# Patient Record
Sex: FEMALE | Race: BLACK OR AFRICAN AMERICAN | NOT HISPANIC OR LATINO | URBAN - METROPOLITAN AREA
[De-identification: names, ages, dates, MRNs, and addresses within clinical notes are randomized per-mention and may not be internally consistent; named-entity substitution may affect disease eponyms.]

---

## 2017-01-01 ENCOUNTER — AMBULATORY - HEALTHEAST (OUTPATIENT)
Dept: BEHAVIORAL HEALTH | Facility: CLINIC | Age: 27
End: 2017-01-01

## 2017-01-04 ENCOUNTER — OFFICE VISIT - HEALTHEAST (OUTPATIENT)
Dept: ADDICTION MEDICINE | Facility: CLINIC | Age: 27
End: 2017-01-04

## 2017-01-04 ENCOUNTER — AMBULATORY - HEALTHEAST (OUTPATIENT)
Dept: BEHAVIORAL HEALTH | Facility: CLINIC | Age: 27
End: 2017-01-04

## 2017-01-04 DIAGNOSIS — F11.20 OPIOID USE DISORDER, SEVERE, DEPENDENCE (H): ICD-10-CM

## 2017-01-04 ASSESSMENT — MIFFLIN-ST. JEOR: SCORE: 1781.83

## 2017-01-05 ENCOUNTER — OFFICE VISIT - HEALTHEAST (OUTPATIENT)
Dept: ADDICTION MEDICINE | Facility: CLINIC | Age: 27
End: 2017-01-05

## 2017-01-05 DIAGNOSIS — F11.20 OPIOID USE DISORDER, SEVERE, DEPENDENCE (H): ICD-10-CM

## 2017-01-06 ENCOUNTER — COMMUNICATION - HEALTHEAST (OUTPATIENT)
Dept: BEHAVIORAL HEALTH | Facility: CLINIC | Age: 27
End: 2017-01-06

## 2017-01-06 DIAGNOSIS — G47.00 INSOMNIA: ICD-10-CM

## 2017-01-06 DIAGNOSIS — F43.22 ADJUSTMENT DISORDER WITH ANXIETY: ICD-10-CM

## 2017-01-08 ENCOUNTER — AMBULATORY - HEALTHEAST (OUTPATIENT)
Dept: BEHAVIORAL HEALTH | Facility: CLINIC | Age: 27
End: 2017-01-08

## 2017-01-09 ENCOUNTER — OFFICE VISIT - HEALTHEAST (OUTPATIENT)
Dept: ADDICTION MEDICINE | Facility: CLINIC | Age: 27
End: 2017-01-09

## 2017-01-09 DIAGNOSIS — F11.20 OPIOID USE DISORDER, SEVERE, DEPENDENCE (H): ICD-10-CM

## 2017-01-10 ENCOUNTER — OFFICE VISIT - HEALTHEAST (OUTPATIENT)
Dept: ADDICTION MEDICINE | Facility: CLINIC | Age: 27
End: 2017-01-10

## 2017-01-10 DIAGNOSIS — F11.20 OPIOID USE DISORDER, SEVERE, DEPENDENCE (H): ICD-10-CM

## 2017-01-12 ENCOUNTER — AMBULATORY - HEALTHEAST (OUTPATIENT)
Dept: BEHAVIORAL HEALTH | Facility: CLINIC | Age: 27
End: 2017-01-12

## 2017-01-12 ENCOUNTER — OFFICE VISIT - HEALTHEAST (OUTPATIENT)
Dept: ADDICTION MEDICINE | Facility: CLINIC | Age: 27
End: 2017-01-12

## 2017-01-12 DIAGNOSIS — F11.20 OPIOID USE DISORDER, SEVERE, DEPENDENCE (H): ICD-10-CM

## 2017-01-12 ASSESSMENT — MIFFLIN-ST. JEOR: SCORE: 1799.98

## 2017-01-13 ENCOUNTER — AMBULATORY - HEALTHEAST (OUTPATIENT)
Dept: ADDICTION MEDICINE | Facility: CLINIC | Age: 27
End: 2017-01-13

## 2017-01-17 ENCOUNTER — OFFICE VISIT - HEALTHEAST (OUTPATIENT)
Dept: ADDICTION MEDICINE | Facility: CLINIC | Age: 27
End: 2017-01-17

## 2017-01-17 DIAGNOSIS — F11.20 OPIOID USE DISORDER, SEVERE, DEPENDENCE (H): ICD-10-CM

## 2017-01-18 ENCOUNTER — OFFICE VISIT - HEALTHEAST (OUTPATIENT)
Dept: ADDICTION MEDICINE | Facility: CLINIC | Age: 27
End: 2017-01-18

## 2017-01-18 DIAGNOSIS — F11.20 OPIOID USE DISORDER, SEVERE, DEPENDENCE (H): ICD-10-CM

## 2017-01-19 ENCOUNTER — OFFICE VISIT - HEALTHEAST (OUTPATIENT)
Dept: ADDICTION MEDICINE | Facility: CLINIC | Age: 27
End: 2017-01-19

## 2017-01-19 ENCOUNTER — OFFICE VISIT - HEALTHEAST (OUTPATIENT)
Dept: BEHAVIORAL HEALTH | Facility: CLINIC | Age: 27
End: 2017-01-19

## 2017-01-19 DIAGNOSIS — F34.1 DYSTHYMIA: ICD-10-CM

## 2017-01-19 DIAGNOSIS — F11.20 OPIOID USE DISORDER, SEVERE, DEPENDENCE (H): ICD-10-CM

## 2017-01-19 ASSESSMENT — MIFFLIN-ST. JEOR: SCORE: 1836.26

## 2017-01-20 ENCOUNTER — AMBULATORY - HEALTHEAST (OUTPATIENT)
Dept: ADDICTION MEDICINE | Facility: CLINIC | Age: 27
End: 2017-01-20

## 2017-01-24 ENCOUNTER — OFFICE VISIT - HEALTHEAST (OUTPATIENT)
Dept: ADDICTION MEDICINE | Facility: CLINIC | Age: 27
End: 2017-01-24

## 2017-01-24 DIAGNOSIS — F11.20 OPIOID USE DISORDER, SEVERE, DEPENDENCE (H): ICD-10-CM

## 2017-01-30 ENCOUNTER — COMMUNICATION - HEALTHEAST (OUTPATIENT)
Dept: ADDICTION MEDICINE | Facility: CLINIC | Age: 27
End: 2017-01-30

## 2017-01-31 ENCOUNTER — OFFICE VISIT - HEALTHEAST (OUTPATIENT)
Dept: ADDICTION MEDICINE | Facility: CLINIC | Age: 27
End: 2017-01-31

## 2017-01-31 ENCOUNTER — AMBULATORY - HEALTHEAST (OUTPATIENT)
Dept: ADDICTION MEDICINE | Facility: CLINIC | Age: 27
End: 2017-01-31

## 2017-01-31 DIAGNOSIS — F11.20 OPIOID USE DISORDER, SEVERE, DEPENDENCE (H): ICD-10-CM

## 2017-02-01 ENCOUNTER — COMMUNICATION - HEALTHEAST (OUTPATIENT)
Dept: ADDICTION MEDICINE | Facility: CLINIC | Age: 27
End: 2017-02-01

## 2017-02-01 ENCOUNTER — OFFICE VISIT - HEALTHEAST (OUTPATIENT)
Dept: ADDICTION MEDICINE | Facility: CLINIC | Age: 27
End: 2017-02-01

## 2017-02-01 DIAGNOSIS — F11.20 OPIOID USE DISORDER, SEVERE, DEPENDENCE (H): ICD-10-CM

## 2017-02-02 ENCOUNTER — OFFICE VISIT - HEALTHEAST (OUTPATIENT)
Dept: ADDICTION MEDICINE | Facility: CLINIC | Age: 27
End: 2017-02-02

## 2017-02-02 ENCOUNTER — OFFICE VISIT - HEALTHEAST (OUTPATIENT)
Dept: BEHAVIORAL HEALTH | Facility: CLINIC | Age: 27
End: 2017-02-02

## 2017-02-02 DIAGNOSIS — G47.00 INSOMNIA: ICD-10-CM

## 2017-02-02 DIAGNOSIS — F11.20 OPIOID USE DISORDER, SEVERE, DEPENDENCE (H): ICD-10-CM

## 2017-02-02 DIAGNOSIS — F34.1 DYSTHYMIA: ICD-10-CM

## 2017-02-02 ASSESSMENT — MIFFLIN-ST. JEOR: SCORE: 1818.12

## 2017-02-03 ENCOUNTER — AMBULATORY - HEALTHEAST (OUTPATIENT)
Dept: ADDICTION MEDICINE | Facility: CLINIC | Age: 27
End: 2017-02-03

## 2017-02-08 ENCOUNTER — AMBULATORY - HEALTHEAST (OUTPATIENT)
Dept: ADDICTION MEDICINE | Facility: CLINIC | Age: 27
End: 2017-02-08

## 2017-02-08 ENCOUNTER — OFFICE VISIT - HEALTHEAST (OUTPATIENT)
Dept: BEHAVIORAL HEALTH | Facility: CLINIC | Age: 27
End: 2017-02-08

## 2017-02-08 ENCOUNTER — OFFICE VISIT - HEALTHEAST (OUTPATIENT)
Dept: ADDICTION MEDICINE | Facility: CLINIC | Age: 27
End: 2017-02-08

## 2017-02-08 DIAGNOSIS — F33.0 MAJOR DEPRESSIVE DISORDER, RECURRENT EPISODE, MILD (H): ICD-10-CM

## 2017-02-08 DIAGNOSIS — F11.10 OPIOID USE DISORDER, MILD, ABUSE (H): ICD-10-CM

## 2017-02-08 DIAGNOSIS — F11.20 OPIOID USE DISORDER, SEVERE, DEPENDENCE (H): ICD-10-CM

## 2017-02-08 DIAGNOSIS — F41.1 GAD (GENERALIZED ANXIETY DISORDER): ICD-10-CM

## 2017-02-10 ENCOUNTER — AMBULATORY - HEALTHEAST (OUTPATIENT)
Dept: ADDICTION MEDICINE | Facility: CLINIC | Age: 27
End: 2017-02-10

## 2017-02-15 ENCOUNTER — OFFICE VISIT - HEALTHEAST (OUTPATIENT)
Dept: ADDICTION MEDICINE | Facility: CLINIC | Age: 27
End: 2017-02-15

## 2017-02-15 DIAGNOSIS — F11.20 OPIOID USE DISORDER, SEVERE, DEPENDENCE (H): ICD-10-CM

## 2017-02-16 ENCOUNTER — OFFICE VISIT - HEALTHEAST (OUTPATIENT)
Dept: BEHAVIORAL HEALTH | Facility: CLINIC | Age: 27
End: 2017-02-16

## 2017-02-16 DIAGNOSIS — F34.1 DYSTHYMIA: ICD-10-CM

## 2017-02-16 DIAGNOSIS — F43.22 ADJUSTMENT DISORDER WITH ANXIETY: ICD-10-CM

## 2017-02-16 DIAGNOSIS — F11.20 OPIOID USE DISORDER, SEVERE, DEPENDENCE (H): ICD-10-CM

## 2017-02-16 ASSESSMENT — MIFFLIN-ST. JEOR: SCORE: 1863.48

## 2017-02-17 ENCOUNTER — COMMUNICATION - HEALTHEAST (OUTPATIENT)
Dept: BEHAVIORAL HEALTH | Facility: CLINIC | Age: 27
End: 2017-02-17

## 2017-02-20 ENCOUNTER — RECORDS - HEALTHEAST (OUTPATIENT)
Dept: ADMINISTRATIVE | Facility: OTHER | Age: 27
End: 2017-02-20

## 2017-02-20 ENCOUNTER — AMBULATORY - HEALTHEAST (OUTPATIENT)
Dept: ADDICTION MEDICINE | Facility: CLINIC | Age: 27
End: 2017-02-20

## 2017-02-22 ENCOUNTER — OFFICE VISIT - HEALTHEAST (OUTPATIENT)
Dept: ADDICTION MEDICINE | Facility: CLINIC | Age: 27
End: 2017-02-22

## 2017-02-22 DIAGNOSIS — F11.20 OPIOID USE DISORDER, SEVERE, DEPENDENCE (H): ICD-10-CM

## 2017-02-24 ENCOUNTER — AMBULATORY - HEALTHEAST (OUTPATIENT)
Dept: ADDICTION MEDICINE | Facility: CLINIC | Age: 27
End: 2017-02-24

## 2017-03-01 ENCOUNTER — AMBULATORY - HEALTHEAST (OUTPATIENT)
Dept: BEHAVIORAL HEALTH | Facility: CLINIC | Age: 27
End: 2017-03-01

## 2017-03-01 ENCOUNTER — OFFICE VISIT - HEALTHEAST (OUTPATIENT)
Dept: ADDICTION MEDICINE | Facility: CLINIC | Age: 27
End: 2017-03-01

## 2017-03-01 DIAGNOSIS — F34.1 DYSTHYMIA: ICD-10-CM

## 2017-03-01 DIAGNOSIS — F11.20 OPIOID USE DISORDER, SEVERE, DEPENDENCE (H): ICD-10-CM

## 2017-03-01 DIAGNOSIS — G47.00 INSOMNIA: ICD-10-CM

## 2017-03-01 ASSESSMENT — MIFFLIN-ST. JEOR: SCORE: 1868.01

## 2017-03-03 ENCOUNTER — AMBULATORY - HEALTHEAST (OUTPATIENT)
Dept: ADDICTION MEDICINE | Facility: CLINIC | Age: 27
End: 2017-03-03

## 2017-03-06 ENCOUNTER — AMBULATORY - HEALTHEAST (OUTPATIENT)
Dept: BEHAVIORAL HEALTH | Facility: CLINIC | Age: 27
End: 2017-03-06

## 2017-03-08 ENCOUNTER — AMBULATORY - HEALTHEAST (OUTPATIENT)
Dept: BEHAVIORAL HEALTH | Facility: CLINIC | Age: 27
End: 2017-03-08

## 2017-03-16 ENCOUNTER — OFFICE VISIT - HEALTHEAST (OUTPATIENT)
Dept: ADDICTION MEDICINE | Facility: CLINIC | Age: 27
End: 2017-03-16

## 2017-03-16 ENCOUNTER — OFFICE VISIT - HEALTHEAST (OUTPATIENT)
Dept: BEHAVIORAL HEALTH | Facility: CLINIC | Age: 27
End: 2017-03-16

## 2017-03-16 DIAGNOSIS — F34.1 DYSTHYMIA: ICD-10-CM

## 2017-03-16 DIAGNOSIS — F11.20 OPIOID USE DISORDER, SEVERE, DEPENDENCE (H): ICD-10-CM

## 2017-03-16 DIAGNOSIS — F43.22 ADJUSTMENT DISORDER WITH ANXIETY: ICD-10-CM

## 2017-03-16 ASSESSMENT — MIFFLIN-ST. JEOR: SCORE: 1895.23

## 2017-03-17 ENCOUNTER — OFFICE VISIT - HEALTHEAST (OUTPATIENT)
Dept: BEHAVIORAL HEALTH | Facility: CLINIC | Age: 27
End: 2017-03-17

## 2017-03-17 ENCOUNTER — AMBULATORY - HEALTHEAST (OUTPATIENT)
Dept: ADDICTION MEDICINE | Facility: CLINIC | Age: 27
End: 2017-03-17

## 2017-03-17 DIAGNOSIS — F11.10 OPIOID USE DISORDER, MILD, ABUSE (H): ICD-10-CM

## 2017-03-17 DIAGNOSIS — F41.1 GAD (GENERALIZED ANXIETY DISORDER): ICD-10-CM

## 2017-03-17 DIAGNOSIS — F33.0 MAJOR DEPRESSIVE DISORDER, RECURRENT EPISODE, MILD (H): ICD-10-CM

## 2017-03-20 ENCOUNTER — COMMUNICATION - HEALTHEAST (OUTPATIENT)
Dept: BEHAVIORAL HEALTH | Facility: CLINIC | Age: 27
End: 2017-03-20

## 2017-03-31 ENCOUNTER — OFFICE VISIT - HEALTHEAST (OUTPATIENT)
Dept: BEHAVIORAL HEALTH | Facility: CLINIC | Age: 27
End: 2017-03-31

## 2017-03-31 ENCOUNTER — AMBULATORY - HEALTHEAST (OUTPATIENT)
Dept: BEHAVIORAL HEALTH | Facility: CLINIC | Age: 27
End: 2017-03-31

## 2017-03-31 DIAGNOSIS — F11.20 OPIOID USE DISORDER, SEVERE, DEPENDENCE (H): ICD-10-CM

## 2017-03-31 ASSESSMENT — MIFFLIN-ST. JEOR: SCORE: 1895.23

## 2017-04-03 ENCOUNTER — COMMUNICATION - HEALTHEAST (OUTPATIENT)
Dept: BEHAVIORAL HEALTH | Facility: CLINIC | Age: 27
End: 2017-04-03

## 2017-04-09 ENCOUNTER — COMMUNICATION - HEALTHEAST (OUTPATIENT)
Dept: BEHAVIORAL HEALTH | Facility: CLINIC | Age: 27
End: 2017-04-09

## 2017-04-09 DIAGNOSIS — F34.1 DYSTHYMIA: ICD-10-CM

## 2017-04-13 ENCOUNTER — AMBULATORY - HEALTHEAST (OUTPATIENT)
Dept: BEHAVIORAL HEALTH | Facility: CLINIC | Age: 27
End: 2017-04-13

## 2017-04-13 DIAGNOSIS — F34.1 DYSTHYMIA: ICD-10-CM

## 2017-04-13 DIAGNOSIS — F11.20 OPIOID USE DISORDER, SEVERE, DEPENDENCE (H): ICD-10-CM

## 2017-04-13 DIAGNOSIS — F43.22 ADJUSTMENT DISORDER WITH ANXIETY: ICD-10-CM

## 2017-04-13 ASSESSMENT — MIFFLIN-ST. JEOR: SCORE: 1917.91

## 2017-04-17 ENCOUNTER — COMMUNICATION - HEALTHEAST (OUTPATIENT)
Dept: ADDICTION MEDICINE | Facility: CLINIC | Age: 27
End: 2017-04-17

## 2017-04-17 ENCOUNTER — OFFICE VISIT - HEALTHEAST (OUTPATIENT)
Dept: ADDICTION MEDICINE | Facility: CLINIC | Age: 27
End: 2017-04-17

## 2017-04-17 DIAGNOSIS — F11.20 OPIOID USE DISORDER, SEVERE, DEPENDENCE (H): ICD-10-CM

## 2017-04-21 ENCOUNTER — AMBULATORY - HEALTHEAST (OUTPATIENT)
Dept: ADDICTION MEDICINE | Facility: CLINIC | Age: 27
End: 2017-04-21

## 2017-04-26 ENCOUNTER — COMMUNICATION - HEALTHEAST (OUTPATIENT)
Dept: ADDICTION MEDICINE | Facility: CLINIC | Age: 27
End: 2017-04-26

## 2017-04-27 ENCOUNTER — OFFICE VISIT - HEALTHEAST (OUTPATIENT)
Dept: BEHAVIORAL HEALTH | Facility: CLINIC | Age: 27
End: 2017-04-27

## 2017-04-27 DIAGNOSIS — F11.20 OPIOID USE DISORDER, SEVERE, DEPENDENCE (H): ICD-10-CM

## 2017-04-27 DIAGNOSIS — F33.0 MAJOR DEPRESSIVE DISORDER, RECURRENT EPISODE, MILD (H): ICD-10-CM

## 2017-04-27 DIAGNOSIS — F41.1 GAD (GENERALIZED ANXIETY DISORDER): ICD-10-CM

## 2017-04-28 ENCOUNTER — OFFICE VISIT - HEALTHEAST (OUTPATIENT)
Dept: BEHAVIORAL HEALTH | Facility: CLINIC | Age: 27
End: 2017-04-28

## 2017-04-28 ENCOUNTER — AMBULATORY - HEALTHEAST (OUTPATIENT)
Dept: BEHAVIORAL HEALTH | Facility: CLINIC | Age: 27
End: 2017-04-28

## 2017-04-28 DIAGNOSIS — F11.20 OPIOID USE DISORDER, SEVERE, DEPENDENCE (H): ICD-10-CM

## 2017-04-28 DIAGNOSIS — F34.1 DYSTHYMIA: ICD-10-CM

## 2017-04-28 ASSESSMENT — MIFFLIN-ST. JEOR: SCORE: 1940.59

## 2017-05-03 ENCOUNTER — OFFICE VISIT - HEALTHEAST (OUTPATIENT)
Dept: ADDICTION MEDICINE | Facility: CLINIC | Age: 27
End: 2017-05-03

## 2017-05-03 DIAGNOSIS — F11.20 OPIOID USE DISORDER, SEVERE, DEPENDENCE (H): ICD-10-CM

## 2017-05-05 ENCOUNTER — AMBULATORY - HEALTHEAST (OUTPATIENT)
Dept: ADDICTION MEDICINE | Facility: CLINIC | Age: 27
End: 2017-05-05

## 2017-05-15 ENCOUNTER — AMBULATORY - HEALTHEAST (OUTPATIENT)
Dept: ADDICTION MEDICINE | Facility: CLINIC | Age: 27
End: 2017-05-15

## 2017-05-25 ENCOUNTER — OFFICE VISIT - HEALTHEAST (OUTPATIENT)
Dept: BEHAVIORAL HEALTH | Facility: CLINIC | Age: 27
End: 2017-05-25

## 2017-05-25 DIAGNOSIS — F11.20 OPIOID USE DISORDER, SEVERE, DEPENDENCE (H): ICD-10-CM

## 2017-05-25 DIAGNOSIS — F34.1 DYSTHYMIA: ICD-10-CM

## 2017-05-25 ASSESSMENT — MIFFLIN-ST. JEOR: SCORE: 1936.05

## 2017-06-22 ENCOUNTER — COMMUNICATION - HEALTHEAST (OUTPATIENT)
Dept: ADDICTION MEDICINE | Facility: CLINIC | Age: 27
End: 2017-06-22

## 2017-06-29 ENCOUNTER — OFFICE VISIT - HEALTHEAST (OUTPATIENT)
Dept: ADDICTION MEDICINE | Facility: CLINIC | Age: 27
End: 2017-06-29

## 2017-06-29 ENCOUNTER — OFFICE VISIT - HEALTHEAST (OUTPATIENT)
Dept: BEHAVIORAL HEALTH | Facility: CLINIC | Age: 27
End: 2017-06-29

## 2017-06-29 DIAGNOSIS — F34.1 DYSTHYMIA: ICD-10-CM

## 2017-06-29 DIAGNOSIS — G47.00 INSOMNIA, UNSPECIFIED: ICD-10-CM

## 2017-06-29 DIAGNOSIS — F11.20 OPIOID USE DISORDER, SEVERE, DEPENDENCE (H): ICD-10-CM

## 2017-06-29 DIAGNOSIS — F43.22 ADJUSTMENT DISORDER WITH ANXIETY: ICD-10-CM

## 2017-06-29 ASSESSMENT — MIFFLIN-ST. JEOR: SCORE: 1949.66

## 2017-06-30 ENCOUNTER — AMBULATORY - HEALTHEAST (OUTPATIENT)
Dept: ADDICTION MEDICINE | Facility: CLINIC | Age: 27
End: 2017-06-30

## 2017-07-21 ENCOUNTER — OFFICE VISIT - HEALTHEAST (OUTPATIENT)
Dept: BEHAVIORAL HEALTH | Facility: CLINIC | Age: 27
End: 2017-07-21

## 2017-07-21 DIAGNOSIS — F41.1 GAD (GENERALIZED ANXIETY DISORDER): ICD-10-CM

## 2017-07-21 DIAGNOSIS — F33.0 MAJOR DEPRESSIVE DISORDER, RECURRENT EPISODE, MILD (H): ICD-10-CM

## 2017-07-25 ENCOUNTER — AMBULATORY - HEALTHEAST (OUTPATIENT)
Dept: ADDICTION MEDICINE | Facility: CLINIC | Age: 27
End: 2017-07-25

## 2017-08-02 ENCOUNTER — OFFICE VISIT - HEALTHEAST (OUTPATIENT)
Dept: BEHAVIORAL HEALTH | Facility: CLINIC | Age: 27
End: 2017-08-02

## 2017-08-02 DIAGNOSIS — F11.20 OPIOID USE DISORDER, SEVERE, DEPENDENCE (H): ICD-10-CM

## 2017-08-02 DIAGNOSIS — F43.22 ADJUSTMENT DISORDER WITH ANXIETY: ICD-10-CM

## 2017-08-02 DIAGNOSIS — F34.1 DYSTHYMIA: ICD-10-CM

## 2017-08-02 ASSESSMENT — MIFFLIN-ST. JEOR: SCORE: 1967.81

## 2017-08-09 ENCOUNTER — AMBULATORY - HEALTHEAST (OUTPATIENT)
Dept: ADDICTION MEDICINE | Facility: CLINIC | Age: 27
End: 2017-08-09

## 2017-08-10 ENCOUNTER — AMBULATORY - HEALTHEAST (OUTPATIENT)
Dept: ADDICTION MEDICINE | Facility: CLINIC | Age: 27
End: 2017-08-10

## 2017-08-23 ENCOUNTER — COMMUNICATION - HEALTHEAST (OUTPATIENT)
Dept: BEHAVIORAL HEALTH | Facility: CLINIC | Age: 27
End: 2017-08-23

## 2017-08-31 ENCOUNTER — AMBULATORY - HEALTHEAST (OUTPATIENT)
Dept: LAB | Facility: CLINIC | Age: 27
End: 2017-08-31

## 2017-08-31 ENCOUNTER — OFFICE VISIT - HEALTHEAST (OUTPATIENT)
Dept: BEHAVIORAL HEALTH | Facility: CLINIC | Age: 27
End: 2017-08-31

## 2017-08-31 ENCOUNTER — COMMUNICATION - HEALTHEAST (OUTPATIENT)
Dept: BEHAVIORAL HEALTH | Facility: CLINIC | Age: 27
End: 2017-08-31

## 2017-08-31 DIAGNOSIS — F11.20 OPIOID USE DISORDER, SEVERE, DEPENDENCE (H): ICD-10-CM

## 2017-08-31 DIAGNOSIS — F33.0 MAJOR DEPRESSIVE DISORDER, RECURRENT EPISODE, MILD (H): ICD-10-CM

## 2017-08-31 DIAGNOSIS — F41.1 GAD (GENERALIZED ANXIETY DISORDER): ICD-10-CM

## 2017-08-31 DIAGNOSIS — F34.1 DYSTHYMIA: ICD-10-CM

## 2017-09-01 ENCOUNTER — AMBULATORY - HEALTHEAST (OUTPATIENT)
Dept: BEHAVIORAL HEALTH | Facility: CLINIC | Age: 27
End: 2017-09-01

## 2017-09-06 ENCOUNTER — COMMUNICATION - HEALTHEAST (OUTPATIENT)
Dept: BEHAVIORAL HEALTH | Facility: CLINIC | Age: 27
End: 2017-09-06

## 2017-09-20 ENCOUNTER — OFFICE VISIT - HEALTHEAST (OUTPATIENT)
Dept: BEHAVIORAL HEALTH | Facility: CLINIC | Age: 27
End: 2017-09-20

## 2017-09-20 DIAGNOSIS — F11.20 OPIATE DEPENDENCE (H): ICD-10-CM

## 2017-09-28 ENCOUNTER — AMBULATORY - HEALTHEAST (OUTPATIENT)
Dept: LAB | Facility: CLINIC | Age: 27
End: 2017-09-28

## 2017-09-28 ENCOUNTER — COMMUNICATION - HEALTHEAST (OUTPATIENT)
Dept: BEHAVIORAL HEALTH | Facility: CLINIC | Age: 27
End: 2017-09-28

## 2017-09-28 ENCOUNTER — OFFICE VISIT - HEALTHEAST (OUTPATIENT)
Dept: BEHAVIORAL HEALTH | Facility: CLINIC | Age: 27
End: 2017-09-28

## 2017-09-28 DIAGNOSIS — F11.20 OPIOID USE DISORDER, SEVERE, DEPENDENCE (H): ICD-10-CM

## 2017-09-28 DIAGNOSIS — F34.1 DYSTHYMIA: ICD-10-CM

## 2017-09-29 ENCOUNTER — COMMUNICATION - HEALTHEAST (OUTPATIENT)
Dept: BEHAVIORAL HEALTH | Facility: CLINIC | Age: 27
End: 2017-09-29

## 2017-09-29 ENCOUNTER — RECORDS - HEALTHEAST (OUTPATIENT)
Dept: ADMINISTRATIVE | Facility: OTHER | Age: 27
End: 2017-09-29

## 2017-10-03 ENCOUNTER — COMMUNICATION - HEALTHEAST (OUTPATIENT)
Dept: BEHAVIORAL HEALTH | Facility: CLINIC | Age: 27
End: 2017-10-03

## 2017-10-04 ENCOUNTER — COMMUNICATION - HEALTHEAST (OUTPATIENT)
Dept: BEHAVIORAL HEALTH | Facility: CLINIC | Age: 27
End: 2017-10-04

## 2017-10-11 ENCOUNTER — COMMUNICATION - HEALTHEAST (OUTPATIENT)
Dept: BEHAVIORAL HEALTH | Facility: CLINIC | Age: 27
End: 2017-10-11

## 2017-10-11 ENCOUNTER — OFFICE VISIT - HEALTHEAST (OUTPATIENT)
Dept: BEHAVIORAL HEALTH | Facility: CLINIC | Age: 27
End: 2017-10-11

## 2017-10-11 ENCOUNTER — COMMUNICATION - HEALTHEAST (OUTPATIENT)
Dept: BEHAVIORAL HEALTH | Facility: HOSPITAL | Age: 27
End: 2017-10-11

## 2017-10-11 DIAGNOSIS — F11.20 OPIOID USE DISORDER, SEVERE, DEPENDENCE (H): ICD-10-CM

## 2017-10-11 DIAGNOSIS — F43.22 ADJUSTMENT DISORDER WITH ANXIETY: ICD-10-CM

## 2017-10-11 ASSESSMENT — MIFFLIN-ST. JEOR: SCORE: 1995.02

## 2017-10-16 ENCOUNTER — COMMUNICATION - HEALTHEAST (OUTPATIENT)
Dept: INTERNAL MEDICINE | Facility: CLINIC | Age: 27
End: 2017-10-16

## 2017-10-18 ENCOUNTER — COMMUNICATION - HEALTHEAST (OUTPATIENT)
Dept: BEHAVIORAL HEALTH | Facility: CLINIC | Age: 27
End: 2017-10-18

## 2017-10-19 ENCOUNTER — OFFICE VISIT - HEALTHEAST (OUTPATIENT)
Dept: BEHAVIORAL HEALTH | Facility: CLINIC | Age: 27
End: 2017-10-19

## 2017-10-19 DIAGNOSIS — F11.20 OPIOID USE DISORDER, SEVERE, DEPENDENCE (H): ICD-10-CM

## 2017-10-19 ASSESSMENT — MIFFLIN-ST. JEOR: SCORE: 1999.56

## 2017-10-25 ENCOUNTER — AMBULATORY - HEALTHEAST (OUTPATIENT)
Dept: BEHAVIORAL HEALTH | Facility: CLINIC | Age: 27
End: 2017-10-25

## 2017-10-25 DIAGNOSIS — F11.20 OPIOID USE DISORDER, SEVERE, DEPENDENCE (H): ICD-10-CM

## 2017-10-25 DIAGNOSIS — F43.22 ADJUSTMENT DISORDER WITH ANXIETY: ICD-10-CM

## 2017-10-25 ASSESSMENT — MIFFLIN-ST. JEOR: SCORE: 2008.63

## 2017-10-26 ENCOUNTER — COMMUNICATION - HEALTHEAST (OUTPATIENT)
Dept: BEHAVIORAL HEALTH | Facility: CLINIC | Age: 27
End: 2017-10-26

## 2017-10-26 ENCOUNTER — AMBULATORY - HEALTHEAST (OUTPATIENT)
Dept: BEHAVIORAL HEALTH | Facility: CLINIC | Age: 27
End: 2017-10-26

## 2017-10-26 DIAGNOSIS — F11.10 OPIATE ABUSE, CONTINUOUS (H): ICD-10-CM

## 2017-10-27 ENCOUNTER — COMMUNICATION - HEALTHEAST (OUTPATIENT)
Dept: BEHAVIORAL HEALTH | Facility: CLINIC | Age: 27
End: 2017-10-27

## 2017-10-27 DIAGNOSIS — F11.20 OPIOID USE DISORDER, SEVERE, DEPENDENCE (H): ICD-10-CM

## 2017-10-31 ENCOUNTER — COMMUNICATION - HEALTHEAST (OUTPATIENT)
Dept: BEHAVIORAL HEALTH | Facility: CLINIC | Age: 27
End: 2017-10-31

## 2017-10-31 DIAGNOSIS — F43.22 ADJUSTMENT DISORDER WITH ANXIETY: ICD-10-CM

## 2017-11-01 ENCOUNTER — AMBULATORY - HEALTHEAST (OUTPATIENT)
Dept: BEHAVIORAL HEALTH | Facility: CLINIC | Age: 27
End: 2017-11-01

## 2017-11-01 ENCOUNTER — OFFICE VISIT - HEALTHEAST (OUTPATIENT)
Dept: BEHAVIORAL HEALTH | Facility: CLINIC | Age: 27
End: 2017-11-01

## 2017-11-01 DIAGNOSIS — F43.22 ADJUSTMENT DISORDER WITH ANXIETY: ICD-10-CM

## 2017-11-01 DIAGNOSIS — F11.20 OPIOID USE DISORDER, SEVERE, DEPENDENCE (H): ICD-10-CM

## 2017-11-01 ASSESSMENT — MIFFLIN-ST. JEOR: SCORE: 1972.34

## 2017-11-06 ENCOUNTER — COMMUNICATION - HEALTHEAST (OUTPATIENT)
Dept: BEHAVIORAL HEALTH | Facility: CLINIC | Age: 27
End: 2017-11-06

## 2017-11-10 ENCOUNTER — AMBULATORY - HEALTHEAST (OUTPATIENT)
Dept: BEHAVIORAL HEALTH | Facility: CLINIC | Age: 27
End: 2017-11-10

## 2017-11-10 ENCOUNTER — OFFICE VISIT - HEALTHEAST (OUTPATIENT)
Dept: BEHAVIORAL HEALTH | Facility: CLINIC | Age: 27
End: 2017-11-10

## 2017-11-10 DIAGNOSIS — F11.20 OPIOID USE DISORDER, SEVERE, DEPENDENCE (H): ICD-10-CM

## 2017-11-10 DIAGNOSIS — F43.22 ADJUSTMENT DISORDER WITH ANXIETY: ICD-10-CM

## 2017-11-10 ASSESSMENT — MIFFLIN-ST. JEOR: SCORE: 1972.34

## 2017-11-16 ENCOUNTER — AMBULATORY - HEALTHEAST (OUTPATIENT)
Dept: BEHAVIORAL HEALTH | Facility: CLINIC | Age: 27
End: 2017-11-16

## 2017-11-16 DIAGNOSIS — F11.20 OPIOID USE DISORDER, SEVERE, DEPENDENCE (H): ICD-10-CM

## 2017-11-16 ASSESSMENT — MIFFLIN-ST. JEOR: SCORE: 1963.27

## 2017-11-24 ENCOUNTER — COMMUNICATION - HEALTHEAST (OUTPATIENT)
Dept: BEHAVIORAL HEALTH | Facility: CLINIC | Age: 27
End: 2017-11-24

## 2017-11-24 DIAGNOSIS — F43.22 ADJUSTMENT DISORDER WITH ANXIETY: ICD-10-CM

## 2017-11-29 ENCOUNTER — AMBULATORY - HEALTHEAST (OUTPATIENT)
Dept: BEHAVIORAL HEALTH | Facility: CLINIC | Age: 27
End: 2017-11-29

## 2017-11-29 DIAGNOSIS — F11.20 OPIOID USE DISORDER, SEVERE, DEPENDENCE (H): ICD-10-CM

## 2017-11-29 ASSESSMENT — MIFFLIN-ST. JEOR: SCORE: 1981.41

## 2017-12-07 ENCOUNTER — AMBULATORY - HEALTHEAST (OUTPATIENT)
Dept: BEHAVIORAL HEALTH | Facility: CLINIC | Age: 27
End: 2017-12-07

## 2017-12-07 DIAGNOSIS — F11.20 OPIOID USE DISORDER, SEVERE, DEPENDENCE (H): ICD-10-CM

## 2017-12-07 ASSESSMENT — MIFFLIN-ST. JEOR: SCORE: 1985.95

## 2017-12-08 ENCOUNTER — AMBULATORY - HEALTHEAST (OUTPATIENT)
Dept: BEHAVIORAL HEALTH | Facility: CLINIC | Age: 27
End: 2017-12-08

## 2017-12-14 ENCOUNTER — AMBULATORY - HEALTHEAST (OUTPATIENT)
Dept: BEHAVIORAL HEALTH | Facility: CLINIC | Age: 27
End: 2017-12-14

## 2017-12-14 DIAGNOSIS — F11.20 OPIOID USE DISORDER, SEVERE, DEPENDENCE (H): ICD-10-CM

## 2017-12-14 ASSESSMENT — MIFFLIN-ST. JEOR: SCORE: 1963.27

## 2017-12-15 ENCOUNTER — OFFICE VISIT - HEALTHEAST (OUTPATIENT)
Dept: BEHAVIORAL HEALTH | Facility: CLINIC | Age: 27
End: 2017-12-15

## 2017-12-15 ENCOUNTER — COMMUNICATION - HEALTHEAST (OUTPATIENT)
Dept: BEHAVIORAL HEALTH | Facility: CLINIC | Age: 27
End: 2017-12-15

## 2017-12-15 DIAGNOSIS — Z91.199 NO-SHOW FOR APPOINTMENT: ICD-10-CM

## 2017-12-15 DIAGNOSIS — F11.20 OPIATE ADDICTION (H): ICD-10-CM

## 2017-12-20 ENCOUNTER — OFFICE VISIT - HEALTHEAST (OUTPATIENT)
Dept: BEHAVIORAL HEALTH | Facility: CLINIC | Age: 27
End: 2017-12-20

## 2017-12-20 DIAGNOSIS — F11.20 OPIOID USE DISORDER, SEVERE, DEPENDENCE (H): ICD-10-CM

## 2017-12-20 ASSESSMENT — MIFFLIN-ST. JEOR: SCORE: 1958.73

## 2021-05-30 VITALS — HEIGHT: 67 IN | WEIGHT: 232 LBS | BODY MASS INDEX: 36.41 KG/M2

## 2021-05-30 VITALS — BODY MASS INDEX: 41.28 KG/M2 | WEIGHT: 263 LBS | HEIGHT: 67 IN

## 2021-05-30 VITALS — BODY MASS INDEX: 37.04 KG/M2 | WEIGHT: 236 LBS | HEIGHT: 67 IN

## 2021-05-30 VITALS — WEIGHT: 247 LBS | HEIGHT: 67 IN | BODY MASS INDEX: 38.77 KG/M2

## 2021-05-30 VITALS — HEIGHT: 67 IN | BODY MASS INDEX: 38.61 KG/M2 | WEIGHT: 246 LBS

## 2021-05-30 VITALS — BODY MASS INDEX: 40.49 KG/M2 | HEIGHT: 67 IN | WEIGHT: 258 LBS

## 2021-05-30 VITALS — HEIGHT: 67 IN | WEIGHT: 253 LBS | BODY MASS INDEX: 39.71 KG/M2

## 2021-05-30 VITALS — BODY MASS INDEX: 39.71 KG/M2 | HEIGHT: 67 IN | WEIGHT: 253 LBS

## 2021-05-30 VITALS — WEIGHT: 228 LBS | BODY MASS INDEX: 35.79 KG/M2 | HEIGHT: 67 IN

## 2021-05-30 VITALS — WEIGHT: 240 LBS | HEIGHT: 67 IN | BODY MASS INDEX: 37.67 KG/M2

## 2021-05-31 VITALS — BODY MASS INDEX: 41.91 KG/M2 | HEIGHT: 67 IN | WEIGHT: 267 LBS

## 2021-05-31 VITALS — HEIGHT: 67 IN | BODY MASS INDEX: 43.32 KG/M2 | WEIGHT: 276 LBS

## 2021-05-31 VITALS — HEIGHT: 67 IN | BODY MASS INDEX: 42.69 KG/M2 | WEIGHT: 272 LBS

## 2021-05-31 VITALS — HEIGHT: 67 IN | BODY MASS INDEX: 42.06 KG/M2 | WEIGHT: 268 LBS

## 2021-05-31 VITALS — WEIGHT: 270 LBS | HEIGHT: 67 IN | BODY MASS INDEX: 42.38 KG/M2

## 2021-05-31 VITALS — BODY MASS INDEX: 42.38 KG/M2 | HEIGHT: 67 IN | WEIGHT: 270 LBS

## 2021-05-31 VITALS — HEIGHT: 67 IN | BODY MASS INDEX: 41.59 KG/M2 | WEIGHT: 265 LBS

## 2021-05-31 VITALS — HEIGHT: 67 IN | WEIGHT: 275 LBS | BODY MASS INDEX: 43.16 KG/M2

## 2021-05-31 VITALS — HEIGHT: 67 IN | WEIGHT: 269 LBS | BODY MASS INDEX: 42.22 KG/M2

## 2021-05-31 VITALS — HEIGHT: 67 IN | WEIGHT: 268 LBS | BODY MASS INDEX: 42.06 KG/M2

## 2021-05-31 VITALS — WEIGHT: 262 LBS | HEIGHT: 67 IN | BODY MASS INDEX: 41.12 KG/M2

## 2021-05-31 VITALS — BODY MASS INDEX: 42.85 KG/M2 | WEIGHT: 273 LBS | HEIGHT: 67 IN

## 2021-05-31 VITALS — HEIGHT: 67 IN | BODY MASS INDEX: 43.63 KG/M2 | WEIGHT: 278 LBS

## 2021-06-01 ENCOUNTER — RECORDS - HEALTHEAST (OUTPATIENT)
Dept: ADMINISTRATIVE | Facility: CLINIC | Age: 31
End: 2021-06-01
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
## 2021-06-08 NOTE — PROGRESS NOTES
D)1:1  A)Maria Fernandaindirania came to [facility] by mistake and this writer was at patient's apartment building.    R)Pt apologized the the mistake.  This writer drove back to [facility] to meet with patient.  Pt had her 2 year old son Walt with her.    T)Pt updated on her last child protection hearing and said it went very well.  Pt said that once Shamar completes the assessment, she can start seeing her 4 month old.  Pt said the next child protection court is scheduled for May and they may close the case at that point.  Pt said she contacted the day care and trying to get Shamar in.  Shamar acted just as any 2 year olds.  He was very active and curious about any objects around him.  Pt said the father is getting picked up by his friend and going to work every day.  When this writer was at patient's apartment, there was a 's office and there seemed to be many services there.  Pt said she is planning on taking DBT class through her apartment.  Pt said she consulted with parenting specialist about potty training and they will give her a gift card to purchase toilet.  Pt said she does not like other residents at her apartment, but the services they offer outweigh that issue.    T)1:1 scheduled at her apartment next Wednesday at 10 am.  Reminded patient of this Thursday's psychotherapy.

## 2021-06-08 NOTE — PROGRESS NOTES
Linnette Alas attended 3 hours of group therapy today and 1 hour individual session to discuss discharge.   1/19/2017 3:16 PM Sarah Mendez, ADC-T

## 2021-06-08 NOTE — PROGRESS NOTES
Addiction Medicine  Outpatient Visit  Dr. Hannah    1/19/2017    Linnette Alas, 1990.    Subjective   Patient was seen today in follow-up for opiate use disorder, severe.    Last UDS performed on 1/12/17 and was: negative  Last BUP performed on 12/27/16 and was: positive     UA collected    Patient's [place]  program profile was reviewed. There has been no worrisome pharmacy activity in the past 3 month      How is life going?  Are there any significant stressors? Any significant depression? Are you working or going to school?  Pt states life is doing good, going good in group and probation. Pt states her baby is now under her mothers care and not with foster care anymore. Pt is happy about that. Pt is living in [place] in an apartment with her 1 year old son. Pt is motivated to stay on the positive side and get everything taken care of so she can have her children again.       Have you maintained sobriety from all substances?  If not what is your problem substance and frequency?   yes       Are you having any cravings?  From 0 to 5 (zero being none) grade your craving   Yes level of 3. At times pt would have to take an extra strip because of cravings, having a lot of anxiety.       Is the medication causing any somnolence or significant side effects?  no       What is the long term plan for suboxone?    Stay on it for a while until pt feels stable enough without it.           Addiction History:  This is substantially documented elsewhere.    Recovery Environment:  This is substantially documented elsewhere.    Allergies:  Blood-group specific substance      Medications:  Current Outpatient Prescriptions   Medication Sig Dispense Refill     buprenorphine-naloxone (SUBOXONE SL FILM) 8-2 mg Film per sublingual film Place 1 Film under the tongue 2 (two) times a day. May sub tabs 14 Film 0     cloNIDine HCl (CATAPRES) 0.1 MG tablet TAKE 1 TABLET BY MOUTH 2 TO 4 TIMES A DAY AS NEEDED 28 tablet 0      doxylamine (UNISON) 25 mg tablet Take 1 tablet (25 mg total) by mouth bedtime as needed for sleep. 30 tablet 0     gabapentin (NEURONTIN) 400 MG capsule Take 1 capsule (400 mg total) by mouth 3 (three) times a day. 90 capsule 0     hydrOXYzine (VISTARIL) 100 MG capsule Take 1 capsule (100 mg total) by mouth 3 (three) times a day as needed for anxiety. 90 capsule 0     traZODone (DESYREL) 100 MG tablet Take 1 tablet (100 mg total) by mouth bedtime. 30 tablet 0     No current facility-administered medications for this visit.              Physical Examination:  Visit Vitals     LMP 2017     Physical Exam  Documented elsewhere        [place] Prescription Monitoring Program:  Reviewed and no worrisome pharmacy activity noted    YIFAN ALVARES  Search Criteria: Last Name 'jeronimo' and First Name 'anatoly' and  = ' and Request Period = '10/21/16' to  ' - 8 out of 8 Recipients Selected.  Fill Date Product, Str, Form Qty Days Pt ID Prescriber Written RX# N/R* Pharm MED+  ---------- -------------------------------- ------ ---- --------- ---------- ---------- ------------ ----- --------- ------  2017 SUBOXONE 8 MG-2 MG SL FILM 16.00 8 [id number] 2017 [id number] N [id number] 480.0  2016 SUBOXONE 8 MG-2 MG SL FILM 14.00 7 [id number] 2016 [id number] N [id number] 480.0  2016 SUBOXONE 8 MG-2 MG SL FILM 14.00 7 [id number] 2016 [id number] N [id number] 480.0  2016 LORAZEPAM 0.5 MG TABLET 9.00 3 [id number] 2016 [id number] N [id number] 00.0  2016 GABAPENTIN 400 MG CAPSULE 21.00 7 [id number] 12/15/2016 [id number] N [id number] 00.0  2016 BUPRENORPHINE 8 MG TABLET SL 4.00 2 [id number] 2016 [id number] N [id number] 480.0  2016 GABAPENTIN 400 MG CAPSULE 90.00 30 [id number] 10/27/2016 [id number] R [id number] 00.0  2016 BUPRENORPHINE 8 MG TABLET SL 42.00 21 [id number] 2016 [id number] N [id number]  480.0  10/27/2016 GABAPENTIN 400 MG CAPSULE 90.00 30 [id number] 10/27/2016 [id number] N [id number] 00.0  10/27/2016 BUPRENORPHINE 8 MG TABLET SL 36.00 18 [id number] 10/27/2016 [id number] N [id number] 480.0  *N/R N=New R=Refill  +MED Daily  Prescribers for prescriptions listed  ----------------------------------------------------------------------------------------------------------------------------------  [id number] KIRAN RAGSDALE K, MD; [address], [place] [address]  [id number] DABOVA-RAQUEL BACHDALINA; [address] [facility] [address]  [id number] HERMINIA DALY MD,FACEP; EMERGENCY DEPARTMENT, [facility], [address] [place] [address]  [id number] DAVID OMER MD; [facility], [address], [place] [address]  Pharmacies that dispensed prescriptions listed  ----------------------------------------------------------------------------------------------------------------------------------  [id number] [facility] : [facility] # [id number], [address],[facility] [address],  [id number] [facility] : [facility] # [id number], [address] [facility] [address],  Patients that match search criteria  ----------------------------------------------------------------------------------------------------------------------------------  [id number] YIFAN ALVARES,  [birth year]; [address] [facility] [address]  [id number] YIFAN ALVARES,  [birth year]; [address] [facility] , [facility] [address]  [id number] YIFAN ALVARES,  [birth year]; [id number], [facility] [address]  [id number] YIFAN JAVIER,  [birth year]; [address], [place] [address]  [id number] YIFAN ALVARES,  [birth year]; [address], [place] [address]  [id number] YIFAN JAVIER,  [birth year]; GENERAL DELIVERY, [facility] [address]  [id number] YIFAN ALVARES,  [birth year]; [address], [place] [address]  [id number] YIFAN ALVARES,  [birth year]; [address], [place] [address]  MED  Summary  This section displays cumulative MED values by unique recipient. The MED Max value is the maximum occurrence of cumulative MED  sustained for any 3 consecutive days. This value is calculated based on prescriptions dispensed during the date range requested.  -----------------------------------------------------------------------------------------------------------------------------------  [address] SAHA; 1990; [address], [place] [address]    Correct pharmacy verified with patient and confirmed in snapshot? [x] yes []no    Medications Phoned  to Pharmacy [x] yes []no  Name of Pharmacist:   List Medications, including dose, quantity and instructions  Clonidine 0.1mg 40 tabs no refill    Medication Prescriptions given to patient   [] yes  [x] no   List the name of the drug the prescription was written for.      Medications ordered this visit were e-scribed.  Verified by order class [x] yes  [] no  Suboxone and Clonidine    Medication changes or discontinuations were communicated to patient's pharmacy:  [] yes  [x] no  Pharmacist Spoke With:     UA collected [x] yes  [] no    [place] Prescription Monitoring Program Reviewed? [x] yes  [] no    Referrals/Labs were made to: None    Completed Charge Capture?  [x] yes  [] no    Future appointment was made: [] yes  [x] no    Dictation completed at time of chart check: [] yes  [x] no    I have checked the documentation for today s encounters and the above information has been reviewed and completed.

## 2021-06-08 NOTE — PROGRESS NOTES
Addiction Medicine  Outpatient Visit  Dr. Hannah    2/16/2017    Linnette Alas, 1990.    Subjective   Patient was seen today in follow-up for opiate use disorder, severe.    Last UDS performed on 2/2/17 and was: neg  Last BUP performed on 12/27/16 and was: pos     UA collected    Patient[place]  program profile was [place]. There has been no worrisome pharmacy activity in the past 3 month      How is life going?  Are there any significant stressors? Any significant depression? Are you working or going to school?  Life is pretty good. No stress or depression.       Have you maintained sobriety from all substances? Are you having any cravings?  From 0 to 5 (zero being none) grade your craving. If not what is your problem substance and frequency?   Yes has been sober. No cravings.       What is the long term plan for suboxone?    Stay on it as long as needed until feeling comfortable enough and stable enough to change.         Is the medication causing any somnolence or significant side effects?  No.           Addiction History:  This is substantially documented elsewhere.    Recovery Environment:  This is substantially documented elsewhere.    Allergies:  Blood-group specific substance      Medications:  Current Outpatient Prescriptions   Medication Sig Dispense Refill     buprenorphine-naloxone (SUBOXONE SL FILM) 8-2 mg Film per sublingual film Place 1 Film under the tongue 2 (two) times a day. May sub tabs 30 Film 0     cloNIDine HCl (CATAPRES) 0.1 MG tablet TAKE 1 TABLET p.o. Every 8 - 12 hours for control of severe anxiety. Reduce or stop if extreme light-headed occurs 40 tablet 0     doxylamine (UNISON) 25 mg tablet Take 1 tablet (25 mg total) by mouth bedtime as needed for sleep. 30 tablet 0     gabapentin (NEURONTIN) 400 MG capsule Take 1 capsule (400 mg total) by mouth 3 (three) times a day. 90 capsule 0     hydrOXYzine (VISTARIL) 100 MG capsule Take 1 capsule (100 mg total) by mouth 3 (three) times  a day as needed for anxiety. 90 capsule 0     traZODone (DESYREL) 100 MG tablet Take 1 tablet (100 mg total) by mouth bedtime. 30 tablet 0     No current facility-administered medications for this visit.            Physical Examination:  Visit Vitals     LMP 2017     Physical Exam  Documented elsewhere        [place] Prescription Monitoring Program:  Reviewed and no worrisome pharmacy activity noted    KANDACE ALVARES  Search Criteria: Last Name 'kandace' and First Name 'anatoly' and  =  and Request Period =  to  ' - 9 out of 9 Recipients Selected.  Fill Date Product, Str, Form Qty Days Pt ID Prescriber Written RX# N/R* Pharm MED+  ---------- -------------------------------- ------ ---- --------- ---------- ---------- ------------ ----- --------- ------  2017 SUBOXONE 8 MG-2 MG SL FILM 30.00 15 [id number] 2017 [id number] N [id number] 480.0  2017 SUBOXONE 8 MG-2 MG SL FILM 26.00 15 [id number] 2017 [id number] N [id number] 416.0  2017 SUBOXONE 8 MG-2 MG SL FILM 4.00 2 [id number] 2017 [id number] N [id number] 480.0  2017 SUBOXONE 8 MG-2 MG SL FILM 14.00 7 [id number] 2017 [id number] N [id number] 480.0  2017 GABAPENTIN 400 MG CAPSULE 90.00 30 [id number] 2017 [id number] N [id number] 00.0  2017 SUBOXONE 8 MG-2 MG SL FILM 16.00 8 [id number] 2017 [id number] N [id number] 480.0  2016 SUBOXONE 8 MG-2 MG SL FILM 14.00 7 [id number] 2016 [id number] N [id number] 480.0  2016 SUBOXONE 8 MG-2 MG SL FILM 14.00 7 [id number] 2016 [id number] N [id number] 480.0  2016 LORAZEPAM 0.5 MG TABLET 9.00 3 [id number] 2016 [id number] N [id number] 00.0  2016 GABAPENTIN 400 MG CAPSULE 21.00 7 [id number] 12/15/2016 [id number] N [id number] 00.0  2016 BUPRENORPHINE 8 MG TABLET SL 4.00 2 [id number] 2016 [id number] N [id number] 480.0  2016  GABAPENTIN 400 MG CAPSULE 90.00 30 [id number] 10/27/2016 [id number] R [id number] 00.0  *N/R N=New R=Refill  +MED Daily  Prescribers for prescriptions listed  ----------------------------------------------------------------------------------------------------------------------------------  [id number] KIRAN RAGSDALE K, MD; [address], [place] [address]  [id number] SAJAN-QUITA BACH; [address] [facility] [address]  [id number] HERMINIA DALY MD,FACEP; EMERGENCY DEPARTMENT, [facility], [address] [place] [address]  [id number] DAVID OMER MD; [facility], [address], [place] [address]  Pharmacies that dispensed prescriptions listed  ----------------------------------------------------------------------------------------------------------------------------------  [id number] [facility] : [facility] # [id number], [address] [facility] [address],  [id number] [facility] : [facility] # [id number], [address] [facility] [address],  Patients that match search criteria  ----------------------------------------------------------------------------------------------------------------------------------  [id number] YIFAN ALVARES,  [birth year]; [address] [facility] [address]  [id number] YIFAN ALVARES,  89; [address] [facility] [address] [address]  [id number] YIFAN ALVARES,  89; [id number], [facility] [address]  [id number] YIFAN JAVIER,  89; [address], [place] [address]  [id number] YIFAN ALVARES,  89; [address], [place] [address]  [id number] YIFAN JAVIER,  89; GENERAL DELIVERY, [facility] [address]  [id number] YIFAN ALVARES,  89; [address], [place] [address]  [id number] YIFAN ALVARES,  89; [address], [place] [address]  [id number] YIFAN ALVARES,  89; [address], [place] [address]      Correct pharmacy verified with patient and confirmed in  snapshot? [x] yes []no    Medications Phoned  to Pharmacy [] yes [x]no  Name of Pharmacist:  List Medications, including dose, quantity and instructions      Medication Prescriptions given to patient   [] yes  [x] no   List the name of the drug the prescription was written for.      Medications ordered this visit were e-scribed.  Verified by order class [x] yes  [] no  Suboxone, clonidine, vistaril    Medication changes or discontinuations were communicated to patient's pharmacy:  [] yes  [x] no  Pharmacist Spoke With:     UA collected [x] yes  [] no    [place] Prescription Monitoring [place] Reviewed? [x] yes  [] no    Referrals/Labs were made to: None    Completed Charge Capture?  [x] yes  [] no    Future appointment was made: [x] yes  [] no  2/23/17  Dictation completed at time of chart check: [] yes  [x] no    I have checked the documentation for today s encounters and the above information has been reviewed and completed.

## 2021-06-08 NOTE — PROGRESS NOTES
"Addiction  Nurse Only Visit Note    1/12/2017  Date of last visit: 1/4/17    Reason for today's visit: follow-up    Vitals:    01/12/17 0925   BP: 136/86   Patient Site: Right Arm   Patient Position: Sitting   Cuff Size: Adult Large   Pulse: 96   Temp: 98.8  F (37.1  C)   TempSrc: Oral   Weight: (!) 232 lb (105.2 kg)   Height: 5' 7\" (1.702 m)        If having pain, who will address pain:  No pain    Is pt assisted: No    Urine for toxicology sent today: yes    Last UA date: 1/4/17  Reviewed with patient: yes  Results: DAM neg    AIMS:     Pill count: NA  (Controlled substance only)  Medications needing renewal: see note    Substance use history:    Using alcohol:No  Using street drugs or unprescribed medications: No  Using opioids other that Suboxone:No  Using tobacco:Yes: Describe: 5 cigs/day    Recovery environment:  Attending formal chemical dependency programming: Yes: Describe: MICD with Sarah, 4 days a week  Attending \"outside\" mutual help meetings: Yes: Describe: 1 meeting last week  Has a chemical dependency sponsor: No  Has other elements of structure: Yes: Describe: taking care of son, moved into new apartment last Tuesday, see sober friends  Current Living Situation: apartment    Cravings: yes little bit, 4/10    Sleep: yes trouble staying asleep    Depression: yes 3/10    Anxiety: yes 7/10    Panic Attacks: yes minor one last weekend    Paranoia: no      Hallucinations: no    Suicidal Ideation: no    Homicidal Ideation:no  Comments: none    Physical Problems: no    [place]  was reviewed prior to seeing patient today. See below for embedded report.    Note: Late today for appt due to transportation, kept clinic notified. Has had home visits with 3 month old son at her mother's place. Her mother is going to try to offer temporary foster care until Linnette is cleared to have her son again. She thinks that will happen by February.  She moved into her own apartment last Tuesday with her 3 yr old. His father " "is helping provide . Linnette reports things are \"going well\". She went to one meeting last week, and is going to MICD program 4 days a week.    Suboxone 8/2 mg sl film, takes bid, #14 0-RF, called to pharmacy, spoke with pharmacist Andrew, order read back for accuracy. Next appt 17 with Dr. Hannah.    Patient Instructions   Continue Medications as ordered.  No alcohol or unprescribed drug use.  No driving, if sedated.  Come to the Emergency Room if not feeling safe.  Call the clinic with any questions [phone].  Follow up as directed, for your appointments, per your After Visit Summary Form.      Call the clinic if any concerns: [facility] [phone]  [facility] [phone]  and Report to the emergency room if you feel like harming yourself or others or are psychotic        Lois Cervantes    2017 9:34 AM    YIFAN ALVARES  Search Criteria: Last Name 'jeronimo' and First Name 'linnette' and  = ' and Request Period = '10/13/16' to  ' - 8 out of 8 Recipients Selected.  Fill Date Product, Str, Form Qty Days Pt ID Prescriber Written RX# N/R* Pharm MED+  ---------- -------------------------------- ------ ---- --------- ---------- ---------- ------------ ----- --------- ------  2017 SUBOXONE 8 MG-2 MG SL FILM 16.00 8 [id number] 2017 [id number] N [id number] 480.0  2016 SUBOXONE 8 MG-2 MG SL FILM 14.00 7 [id number] 2016 [id number] N [id number] 480.0  2016 SUBOXONE 8 MG-2 MG SL FILM 14.00 7 [id number] 2016 [id number] N [id number] 480.0  2016 LORAZEPAM 0.5 MG TABLET 9.00 3 [id number] 2016 [id number] N [id number] 00.0  2016 GABAPENTIN 400 MG CAPSULE 21.00 7 [id number] 12/15/2016 [id number] N [id number] 00.0  2016 BUPRENORPHINE 8 MG TABLET SL 4.00 2 [id number] 2016 [id number] N [id number] 480.0  2016 GABAPENTIN 400 MG CAPSULE 90.00 30 [id number] 10/27/2016 [id number] R [id number] " 00.0  2016 BUPRENORPHINE 8 MG TABLET SL 42.00 21 [id number] 2016 [id number] N [id number] 480.0  10/27/2016 GABAPENTIN 400 MG CAPSULE 90.00 30 [id number] 10/27/2016 [id number] N [id number] 00.0  10/27/2016 BUPRENORPHINE 8 MG TABLET SL 36.00 18 [id number] 10/27/2016 [id number] N [id number] 480.0  10/16/2016 GABAPENTIN 400 MG CAPSULE 30.00 10 [id number] 10/16/2016 [id number] N [id number] 00.0  10/16/2016 BUPRENORPHINE 8 MG TABLET SL 26.00 13 [id number] 10/13/2016 [id number] N [id number] 480.0  *N/R N=New R=Refill  +MED Daily  Prescribers for prescriptions listed  ----------------------------------------------------------------------------------------------------------------------------------  [id number] KIRAN RAGSDALE K, MD; [address], [place] [address]  [id number] [facility], RAQUEL[facility]; [address] [facility] [address]  [id number] HERMINIA DALY MD,FACEP; EMERGENCY DEPARTMENT, [facility], [address] [place] [address]  [id number] DAVID OMER MD; [facility], [address], [place] [address]  [id number] RACHEL BURGOS (PA-C); [facility], [address], [place] [address]  Pharmacies that dispensed prescriptions listed  ----------------------------------------------------------------------------------------------------------------------------------  [id number] [facility].; : [facility] # [id number], [address] , [facility] [address],  [id number] [facility].; : [facility] # [id number], [address] [facility] [address],  Patients that match search criteria  ----------------------------------------------------------------------------------------------------------------------------------  [id number] YIFAN ALVARES  88; [address] [facility], [facility] [address]  [id number] YIFAN ALVARES  89; [address] [facility] , [facility] [address]  [id number] YIFAN ALVARES  89;  [id number], [facility] [address]  [id number] YIFAN JAVIER,  89; [address], [place] [address]  [id number] YIFAN ALVARES,  89; [address], [place] [address]  [id number] YIFAN JAVIER,  89; GENERAL DELIVERY, [facility] [address]  [id number] YIFAN ALVARES,  89; [address], [place] [address]  [id number] YIFAN ALVARES,  89; [address], [place] [address]  MED Summary  This section displays cumulative MED values by unique recipient. The MED Max value is the maximum occurrence of cumulative MED
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
## 2021-06-08 NOTE — PROGRESS NOTES
"Addiction  Nurse Only Visit Note    1/4/2017  Date of last visit: 12/27/16    Reason for today's visit:   Chief Complaint   Patient presents with     Follow-up       Vitals:    01/04/17 0813   BP: 105/57   Patient Site: Right Arm   Patient Position: Sitting   Cuff Size: Adult Large   Pulse: 98   Temp: 98.9  F (37.2  C)   Weight: (!) 228 lb (103.4 kg)   Height: 5' 7\" (1.702 m)        If having pain, who will address pain:  none    Is pt assisted: No    Urine for toxicology sent today: yes    Last UA date: 12/27/16  Reviewed with patient: yes  Results: negative    AIMS:     Pill count: No  (Controlled substance only)  Medications needing renewal: see meds and orders    Substance use history:    Using alcohol:No  Using street drugs or unprescribed medications: No  Using opioids other that Suboxone:No  Using tobacco:Yes: Describe: 1/2 pack of cigarettes a day    Recovery environment:  Attending formal chemical dependency programming: Yes: Describe: Outpatient CD treatment 4 days a week with Sarah here at [facility].  Attending \"outside\" mutual help meetings: Yes: Describe: NA twice last week  Has a chemical dependency sponsor: No  Has other elements of structure: Yes: Describe: rolan Crockett for therapy in the Clinic  Current Living Situation: Living with one child    Cravings: yes \"a little yesterday, but is passed\". Overall the medication is working well.     Sleep: yes Difficult to stay asleep. Patient is out of Trazodone, it did help.      Depression: yes rated deression at a 4/10, situational -  youngest not being at home.     Anxiety: yes rated anxiety 7/10 - related to moving, not having PRN medication.    Panic Attacks: no    Paranoia: no      Hallucinations: no      Suicidal Ideation: no    Homicidal Ideation:no  Comments: none    Physical Problems: no    Patient Update: Patient reported the Suboxone working well. She said she had a little craving yesterday, but was able to \"work through it\". Patient continues " to participate with outpatient CD treatment with Sarah here in the Clinic 4 days a week. She additionally went to two NA meeting last week. Patient reported depression at a 4/10 which she relates to not having her baby at home with her. She rated her anxiety at a 7/10, which she attributed to moving into a new apartment and lack of PRN medication for anxiety. Patient denied any SI or HI. Patient currently does not have a primary physician. Information was given at appointment at today. DAM this visit was negative.     Per Dr. Hannah directive, phoned in Suboxone 8-2 mg SL film. 1 film SL BID. #16 Refill 0. Sp[facility] with pharmacist Pinky at [facility]. Order read back for accuracy.     Next appointment with Nurse Only on 17    [place]  reviewed, no suspicious activity noted.    [employer] Information [place] Date: 17  [employer] Query Report Page#: 1  Patient Rx History Report  YIFAN ALVARES  Search Criteria: Last Name 'Yifan' and First Name 'Linnette' and  = ' and Request Period =  to  ' - 8 out of 8 Recipients Selected.  Fill Date Product, Str, Form Qty Days Pt ID Prescriber Written RX# N/R* Pharm MED+  ---------- -------------------------------- ------ ---- --------- ---------- ---------- ------------ ----- --------- ------  2016 SUBOXONE 8 MG-2 MG SL FILM 14.00 7 [id number] 2016 [id number] N [id number] 480.0  2016 SUBOXONE 8 MG-2 MG SL FILM 14.00 7 [id number] 2016 [id number] N [id number] 480.0  2016 LORAZEPAM 0.5 MG TABLET 9.00 3 [id number] 2016 [id number] N [id number] 00.0  2016 GABAPENTIN 400 MG CAPSULE 21.00 7 [id number] 12/15/2016 [id number] N [id number] 00.0  2016 BUPRENORPHINE 8 MG TABLET SL 4.00 2 [id number] 2016 [id number] N [id number] 480.0  2016 GABAPENTIN 400 MG CAPSULE 90.00 30 [id number] 10/27/2016 [id number] R [id number] 00.0  2016 BUPRENORPHINE 8 MG TABLET SL 42.00 21  [id number] 11/14/2016 [id number] N [id number] 480.0  10/27/2016 GABAPENTIN 400 MG CAPSULE 90.00 30 [id number] 10/27/2016 [id number] N [id number] 00.0  10/27/2016 BUPRENORPHINE 8 MG TABLET SL 36.00 18 [id number] 10/27/2016 [id number] N [id number] 480.0  10/16/2016 GABAPENTIN 400 MG CAPSULE 30.00 10 [id number] 10/16/2016 [id number] N [id number] 00.0  10/16/2016 BUPRENORPHINE 8 MG TABLET SL 26.00 13 [id number] 10/13/2016 [id number] N [id number] 480.0  09/24/2016 BUPRENORPHINE 8 MG TABLET SL 21.00 14 [id number] 09/24/2016 [id number] N [id number] 360.0  08/11/2016 BUPRENORPHINE 8 MG TABLET SL 36.00 15 [id number] 08/11/2016 [id number] N [id number] 576.0  07/28/2016 BUPRENORPHINE 8 MG TABLET SL 35.00 14 [id number] 07/28/2016 [id number] N [id number] 600.0  07/15/2016 BUPRENORPHINE 8 MG TABLET SL 18.00 8 [id number] 07/15/2016 [id number] N [id number] 540.0  06/27/2016 BUPRENORPHINE 8 MG TABLET SL 55.00 22 [id number] 06/23/2016 [id number] N [id number] 600.0  06/19/2016 BUPRENORPHINE 8 MG TABLET SL 13.00 6 [id number] 06/17/2016 [id number] N [id number] 520.0  06/07/2016 BUPRENORPHINE 8 MG TABLET SL 38.00 16 [id number] 06/02/2016 [id number] N [id number] 570.0  05/27/2016 BUPRENORPHINE 8 MG TABLET SL 18.00 8 [id number] 05/27/2016 [id number] N [id number] 540.0  04/18/2016 BUPRENORPHINE 8 MG TABLET SL 14.00 7 [id number] 04/18/2016 [id number] N [id number] 480.0  03/31/2016 BUPRENORPHINE 8 MG TABLET SL 16.00 8 [id number] 03/31/2016 [id number] N [id number] 480.0  03/24/2016 BUPRENORPHINE 8 MG TABLET SL 16.00 8 [id number] 03/24/2016 [id number] N [id number] 480.0  03/14/2016 BUPRENORPHINE 8 MG TABLET SL 16.00 8 [id number] 03/14/2016 [id number] N [id number] 480.0  03/11/2016 BUPRENORPHINE 8 MG TABLET SL 6.00 4 [id number] 03/11/2016 [id number] N [id number] 360.0  02/25/2016 BUPRENORPHINE 8 MG TABLET SL 15.00 15 [id number] 02/25/2016 [id number] N [id number]  240.0  02/11/2016 SUBOXONE 8 MG-2 MG SL FILM 14.00 14 [id number] 02/11/2016 [id number] N [id number] 240.0  *N/R N=New R=Refill  +MED Daily  Prescribers for prescriptions listed  ----------------------------------------------------------------------------------------------------------------------------------  [id number] KIRAN RAGSDALE K, MD; [address], [place] [address]  [id number] SAJANQUITA BACH; [address] [facility] [address]  [id number] HERMINIA DALY MD,FACE; EMERGENCY DEPARTMENT, [facility], [address] [place] [address]  [id number] DAVID OMER MD; [facility], [address], [place] [address]  [id number] RACHEL BURGOS (PA-C); [facility], [address], [place] [address]  Pharmacies that dispensed prescriptions listed  ----------------------------------------------------------------------------------------------------------------------------------  [id number] [facility] : [facility] [id number], [address],, [place] [address],  [id number] [facility] : [facility] [id number], [address] [facility] [address] [facility],, [place] [address],  Report Disclaimers:  The report provided above is based upon the search criteria and the data provided by the dispensing entities. For more information  about any prescription, please contact the dispenser or the prescriber.  This report contains confidential information, including patient identifiers, and is not a public record. The information on this  report must be treated as protected health information and is to be disclosed to others only as authorized by applicable state  and Federal regulations.      Education Done Today  Patient Instructions:   Patient Instructions   Continue Medications as ordered.  No alcohol or unprescribed drug use.  No driving, if sedated.  Come to the Emergency Room if not feeling safe.  Call the clinic with any questions [phone].  If you identify that you are  pregnant, please call us to inform us, as medications may need to be changed.  You can also contact Urgent Care Crisis Line at [phone] 24 hours a day for help.  Follow up as directed, for your appointments, per your After Visit Summary Form.            David J Myhre    1/4/2017 8:25 AM

## 2021-06-08 NOTE — PROGRESS NOTES
Met 1x1 with client today for a session on treatment planning and discharge plan. Client seem to be settling in to her new place, and remains sober. We discussed plans for her future, reporting she would like to go to school and have a carreer. She has some barriers to returning to school which she is aware of and was encouraged to go to the school and discuss her options. Discharge plan is to referr her to service Coordination to enhance her recovery and aid in resources. She has not be very successful in the past of maintaining sober in the community and daily life, so service coordination can be a resource for her. She is in agreement to try that.   Sarah Mendez, ADC-T  1/18/2017

## 2021-06-08 NOTE — PROGRESS NOTES
Weekly Progress Note  Linnette Alas  1990  [id number]      D) Pt attended 3 groups  this week with 1 excused absences. A) Staff facilitated groups and reviewed tx progress. Assessed for [facility]. R) No VAP needed at this time. Pt working on the following dimensions:  Dimension #1 - Withdrawal Potential - . Level 0 no concern. Reason Risk Rating assigned: Client is back on her suboxone and a behavior contract in the mental health clinic and in CD due to missed apts and inability to give an acceptable UA on 12/15/16. Both specimens were very diluted and absent of Buropronephrine. Dr. Hannah has put in place a very direct and specific guideline for her to follow to continue her treatment here in the [facility] Pt reports she has remained abstinent from all substances of abuse since discharge from unit 2700 on 10/16/16/. However Ua's and behavior is suspicious. At this time her UA's have been clean. She does not report any withdrawals symptoms at this time.   Dimension #2 - Biomedical - concern.-Level 1, mild concern. Reason Risk Rating Assigned: Client had  2016. Pt reports she is following recommendations post childbirth and has access to medical services as needed. Missing follow up appointment in the mental health clinic causing lack of refill for suboxone. Short script for other mental health meds refilled until client sees a provider on 16 Client was not seen by  on Monday nor given suboxone. She followed up with Dr. Tijerina on 16 at which time she was put on a client contract that requires no missed appointments for clinic or treatment appointment, neg. UA that are not diluted any failure of this contract will mean immediate discharge from the clinic and treatment and referred to probation and civil commit proceedings 2017 The client has been following through as directed. At this time the patient is not expressing any immediate medical or dental concerns.   Dimension #3  - Emotional , Behavioral and Cognitive - concern. Level 2, moderate concern.Reason Risk Rating Assigned: Client reports experiencing symptoms of depression and anxiety, but is unaware of any mental health diagnosis/diagnoses despite being on two medications indicated for mental health prescribed by PCP. Client scored a low-risk on the PANSI. Client would like Mental Health DA from our clinic.Very unhealthy relationship with S/O, client reports it is hard to deal with.   Dimension #4 - Treatment Resistance - concern. Level 2, moderate concern.Reason Risk Rating Assigned:  Client was admitted to 2700 unit directly from group home/work house for probation violation. Client's stated desire to change is incongruent with her actions. Client will be stepping down to two times per week. Client completed her use history in her treatment plan Not engaged in treatment activities and change thinking. Client will remain in treatment for extended time to aid in her understanding of her addiction. Significant motivation from probation, and CPS to stay in treatment and remain sober.  Dimension #5 - Relapse Potential - concern. Level 3, serious concern.Reason Risk Rating Assigned: Client working on relapse triggers/stressors. Pt remains highly vulnerable to relapse due to long hx of return to use and lack of coping skills. At this time the patient has not reported any use.    Dimension #6 - Recovery Environment - concern. Level 3, serious concern. Reason Risk Rating Assigned:  Patient currently living in her new apartment that she obtained through [employer].  Patient is involved with [employer], but has little sober support at home. Pt has probationary obligations with [facility]. Pt is unemployed. Pt signed MALIA's for [employer] and PO. Client reported that she has obtained new housing through [employer], at common [facility] in [place], and has moved in. Client describes her  as alcoholic, and non  "supportive, contemplating ending that relationship.   T) Client educated on relapse prevention. Client has completed 101 of 84 hours of program at this time. Projected discharge date is 2/28/2017. Current discharge plan is service coordination.     Lisa Le        Psycho-Educational Curriculum  Date Attended  Psycho-Educational Curriculum  Date Attended    Acceptance   Shame/Guilt     1st Step   Anger/Rage     Affirmations   Mental Health     Automatic Negative Thoughts   Anxiety     Cross Addiction   Co-Occurring Disorders     Stages of Change   Hallie/Bipolar     Relapse   Trauma      Addictive Thoughts   Victim Identity     Coping Skills   Sober Structure     Relapse Prevention   Continuum of Care     Behavior Chain Analysis 1/16/17     Relapse Scale 1/17/17     Relapse Warning Sings 1/17/17     ABC's Smart Recovery 1/18/17     Medical Aspects   Non-12 Step Support     Brain/Neurotransmitters   Priorities     Medication Compliance   Spirituality     [place] Alcohol/Drug Research   Weekend Planner     Physical Health   Educational Videos     Post Acute Withdrawal   1st Step     Pregnancy and Drug Use   2nd Step     Sexual Health   Assertive Communication     Short-Term/Long-Term Effects   My name is David BLAND    Relationships   Cross Addiction     Assertive Communication   God As We Understood Him     Boundaries   HBO Relapse     Codependence    HBO What Is Addiction     Defense Mechanisms    Medical Aspects 1     Family Roles   Medical Aspects 2     Goodbye Letter   [employer] Geographic: Stress     Intimacy   PBS Depression Out of the Shadows     Needs/Dealbreakers in Relationships   The Anonymous People    Socialization Skills   [place]     Feelings   Vinicius Roy \"Highjacked Brain\"    ABC Model of Emotion   Tommie Fischer Humor in Tx    Grief and Loss   The Mindfulness Movie    Healthy vs. Unhealthy Feelings   David BLAND documentary     Meditation/Mindfulness  1/19/17     Overconfidence/Complacency     "   Resentments       Stress

## 2021-06-08 NOTE — PROGRESS NOTES
[facility] CHEMICAL DEPENDENCY  DISCHARGE SUMMARY            NAME:  Linnette Alas   Physician:  Dr. Adames   MRN:  [id number] :  Heather Champion   #:  [id number] Funding Source:  [facility] [place]   Admit Date:  (Not on file) Discharge Date:  16   :  1990 Days Completed:   21 days   Initial Diagnosis:    Patient Active Problem List   Diagnosis     Rhesus isoimmunization, antepartum     Severe opioid use disorder     Pregnancy complicated by chemical dependency, antepartum, third trimester     S/P  section     Pneumonia of right lower lobe due to infectious organism     Leukocytosis, unspecified type     Anxiety disorder, unspecified    Final Diagnosis:  Opioid use disorder - severe   Discharge Address:        Discharge Type:  With Staff Approval (WSA)    Reasons for and circumstances of service termination:  Client completed 21 days of inpatient treatment and referred to [facility] treatment.     Dimension/Course of Treatment/Individualized Care:   1.  Withdrawal Potential - Risk level - 3 - Serious concern.  Client reports having used Heroin 2/30 days, with last use .  She also reports using marijuana /30 days with last use 16.  Client has been and is currently on Subutex, current dose 16 mg.  Client is approximately 4 months pregnant.  Client continued on Subutex.  Client has appointment in our OP Addiction Clinic with Dr. Adames 16 at 0830.  Risk level 2 - Moderate concern on discharge.     2.  Biomedical Conditions and Complications - Risk level - 3 - Serious concern.  Client is a High risk pregnancy in the context of maternal drug use.  Client is approximately 4 months pregnant.  Client states that she hs had regular prenatal care.  Client also smokes cigarettes, on nicotine patch/gum.  Client was followed by [facility] and has appointment with Dr. Galvan 16 at 1230.  Risk level 2 - Moderate concern on discharge.     3.  Emotional/Behavioral/Cognitive Conditions and  Complications - Risk level - 0 - No concern.  Client denies any mental health concerns/issues.  Scored a Low risk on PANSI.  Risk level remains a 0 - No concern on discharge.     4.  Treatment Acceptance/Resistance - Risk Level - 2 - Moderate concern.  Client states that she knows she needs treatment and that she has a problem.  Client has continued to use despite beng on Subutex.  Commitment is being pursued.  Client attended Unit functions, completed assignments.  Risk level 1 - Mild concern on discharge.  It was recommended that client enter extended treatment, [place] was presued but client was not sure if she wanted this, so returned home.       5.  Relapse/Continued Use/Continued Problem Potential - Risk level - 4 - Extreme concern.  This is the clients second treatment, she was here a year ago and completed.  Currently lacks relapse prevention strategies.  Has continue to use IV heroin despite being on Subutex.  Client completed Use history and  Consequences as well as some of her other assignment.  Risk level 3 - Serious concern on discharge.  Client waiting on [place] or getting into OP treatment.     6.  Recovery Environment - Risk level - 3 - Serious concern.  Client lives with her boyfriend, father of her son and unborn child.  Client states that he doesn't want her to use, but she feels that he is not all that supportive.  Client has not recently been involved in an abstinent based community support group.  Client is involved with [employer].  Client has court dates pending in [facility].  Client was also scheduled for a PSI 5/1/16.  Contacted [employer] and conference held.  Conference with boyfriend held.  Client has PSI done here.   Court dates were changed.  Risk level remains a 3 - Serious concern on discharge.     Strengths and Needs and Services Provided:  Strengths:  Good mom.  Needs:  To stop using.  Services provided:  Medical, OB care, groups, lectures, films, OT/Rt,  spirituality groups, conferences with boyfriend, [employer], court dates changed, discharge planning, 1-1's with counselor.         Program Involvement: Fair  Attendance: Fair  Ability to relate in group/   Other program activities: Fair  Assignment Completion: Fair  Overall Behavior: Fair  Reported Family/Significant   Other Involvement: Fair    Prognosis: Fair      Recommendations       Attend 12 Step Meetings, Obtain/Retain 12 Step Program Sponsor, Discharged to Other CD Services, Identify and Maintain a Sober Social and Network of Friends    Mental Health Referral  None      Physical Health Referral:  Personal Physician                Counselor Name and Title:  CINTIA Ugalde        Date:  1/8/2017  Time:  1:40 PM

## 2021-06-08 NOTE — PROGRESS NOTES
D)scheduled 1:1  A)Pt came to 1:1 appointment and updated on his sobriety and life.   R)Pt said she has child protection hearing tomorrow but expressed that she was very confident that she would get her 4 months old back soon. Pt said she met with child protection worker and was reassured that she was doing everything right to reunify.  Pt shared more about her relationship with the father of the children. Pt said he has other 4 children with two different mothers-13 yo girl from one mother and  13 yo, 10 yo and 7 yo boys from another mother. Pt said the boys's mother was homeless for a while and patient and the boyfriend invited them home, which lead him to legal problem because he had a no contact order at that time.  Pt said she learned she needs to work on herself and take care of her own children before offering help to others.  Pt said she may found a day care for her 2 year old and will be calling the place.  We looked into the program online.  Pt said they provide transportation to  and drop off the child at home.  Pt said until she arrange the permanent day care, she would start sending him to in-house day care at her apartment couple times a week.  Pt said she cannot leave the building while using this in-house day care.  We talked about schooling. Pt said she took some cosmetology classes at [employer] but due to morning sickness she stopped attending classes. Pt said they have a tuition forgiveness program but she has to attend a session and make a promise to them that she will not miss classes.  We talked about tax return. Patient does not need to file tax because she is under the income limit and on welfare.  Pt said she was evicted last year due to 3 months of rent non-payment.  Pt seems she will not qualify for renter's rebate.  Pt was here alone today without her 2 year old.  Pt said her boyfriend/the father is taking care of their son. Pt said she talked with a  and was  told the boyfriend can be on the lease with her as well.  Pt said she is hoping that it will make their lives much easier to live together and get  at some point.    Pt has not finished the intake packet for her DA yet.  We used the last 10 minutes to fill as much information we could on the intake packet.    T)This writer will visit patient at home next Wednesday

## 2021-06-08 NOTE — PROGRESS NOTES
Addiction Medicine  Outpatient Visit  Dr. Hannah    2/2/2017    Linnette Alas, 1990.    Subjective   Patient was seen today in follow-up for opiate use disorder, severe.    Last UDS performed on 1/19/17 and was: negative  Last BUP performed on 12/27/16 and was: positve     UA collected    Patient's [place]  program profile was reviewed. There has been no worrisome pharmacy activity in the past 3 month      How is life going?  Are there any significant stressors? Any significant depression? Are you working or going to school?  Life is going, no big stressors or depression. NO SI/HI       Have you maintained sobriety from all substances? Are you having any cravings?  From 0 to 5 (zero being none) grade your craving. If not what is your problem substance and frequency?   Yes, no craving       What is the long term plan for suboxone?    Stay on for as long as pt may need it         Is the medication causing any somnolence or significant side effects?  no           Addiction History:  This is substantially documented elsewhere.    Recovery Environment:  This is substantially documented elsewhere.    Allergies:  Blood-group specific substance      Medications:  Current Outpatient Prescriptions   Medication Sig Dispense Refill     buprenorphine-naloxone (SUBOXONE SL FILM) 8-2 mg Film per sublingual film Place 1 Film under the tongue 2 (two) times a day. May sub tabs 30 Film 0     cloNIDine HCl (CATAPRES) 0.1 MG tablet TAKE 1 TABLET p.o. Every 8 - 12 hours for control of severe anxiety. Reduce or stop if extreme light-headed occurs 40 tablet 0     doxylamine (UNISON) 25 mg tablet Take 1 tablet (25 mg total) by mouth bedtime as needed for sleep. 30 tablet 0     gabapentin (NEURONTIN) 400 MG capsule Take 1 capsule (400 mg total) by mouth 3 (three) times a day. 90 capsule 0     hydrOXYzine (VISTARIL) 100 MG capsule Take 1 capsule (100 mg total) by mouth 3 (three) times a day as needed for anxiety. 90 capsule 0      traZODone (DESYREL) 100 MG tablet Take 1 tablet (100 mg total) by mouth bedtime. 30 tablet 0     No current facility-administered medications for this visit.              Physical Examination:  Visit Vitals     LMP 2017     Physical Exam  Documented elsewhere        [place] Prescription Monitoring Program:  Reviewed and no worrisome pharmacy activity noted      YIFAN ALVARES  Search Criteria: Last Name thomas' and First Name tom' and  =  and Request Period =  to  ' - 8 out of 8 Recipients Selected.  Fill Date Product, Str, Form Qty Days Pt ID Prescriber Written RX# N/R* Pharm MED+  ---------- -------------------------------- ------ ---- --------- ---------- ---------- ------------ ----- --------- ------  2017 SUBOXONE 8 MG-2 MG SL FILM 26.00 15 [id number] 2017 [id number] N [id number] 416.0  2017 SUBOXONE 8 MG-2 MG SL FILM 4.00 2 [id number] 2017 [id number] N [id number] 480.0  2017 SUBOXONE 8 MG-2 MG SL FILM 14.00 7 [id number] 2017 [id number] N [id number] 480.0  2017 GABAPENTIN 400 MG CAPSULE 90.00 30 [id number] 2017 [id number] N [id number] 00.0  2017 SUBOXONE 8 MG-2 MG SL FILM 16.00 8 [id number] 2017 [id number] N [id number] 480.0  2016 SUBOXONE 8 MG-2 MG SL FILM 14.00 7 [id number] 2016 [id number] N [id number] 480.0  2016 SUBOXONE 8 MG-2 MG SL FILM 14.00 7 [id number] 2016 [id number] N [id number] 480.0  2016 LORAZEPAM 0.5 MG TABLET 9.00 3 [id number] 2016 [id number] N [id number] 00.0  2016 GABAPENTIN 400 MG CAPSULE 21.00 7 [id number] 12/15/2016 [id number] N [id number] 00.0  2016 BUPRENORPHINE 8 MG TABLET SL 4.00 2 [id number] 2016 [id number] N [id number] 480.0  2016 GABAPENTIN 400 MG CAPSULE 90.00 30 [id number] 10/27/2016 [id number] R [id number] 00.0  2016 BUPRENORPHINE 8 MG TABLET SL 42.00 21 [id number]  [id number] 2016 [id number] N [id number] 480.0  *N/R N=New R=Refill  +MED Daily  Prescribers for prescriptions listed  ----------------------------------------------------------------------------------------------------------------------------------  [id number] KIRAN RAGSDALE K, MD; [address], [place] [address]  [id number] SAJAN-QUITA BACH; [address] [facility] [address]  [id number] HERMINIA DALY MD,FACEP; EMERGENCY DEPARTMENT, [facility], [address] [place] [address]  [id number] DAVID OMER MD; [facility], [address], [place] [address]  Pharmacies that dispensed prescriptions listed  ----------------------------------------------------------------------------------------------------------------------------------  [id number] [facility] : [facility] # [id number], [address] [facility] [address],  [id number] [facility] : [facility] # [id number], [address] [facility] [address],  Patients that match search criteria  ----------------------------------------------------------------------------------------------------------------------------------  [id number] YIFAN ALVARES,  [birth year]; [address] [facility], [facility] [address]  [id number] YIFAN ALVARES,  [birth year]; [address] [facility] , [facility] [address]  [id number] YIFAN ALVARES,  [birth year]; [id number], [facility] [address]  [id number] YIFAN JAVIER,  [birth year]; [address], [place] [address]  [id number] YIFAN ALVARES,  [birth year]; [address], [place] [address]  [id number] YIFAN JAVIER,  [birth year]; GENERAL DELIVERY, [facility] [address]  [id number] YIFAN ALVARES,  [birth year]; [address], [place] [address]  [id number] YIFAN ALVARES,  [birth year]; [address], [place] [address]    Correct pharmacy verified with patient and confirmed in snapshot? [x] yes []no    Medications Phoned  to Pharmacy [] yes [x]no  Name of Pharmacist:  List Medications, including  dose, quantity and instructions      Medication Prescriptions given to patient   [] yes  [x] no   List the name of the drug the prescription was written for.      Medications ordered this visit were e-scribed.  Verified by order class [x] yes  [] no  Suboxone, Clonidine, Trazodone    Medication changes or discontinuations were communicated to patient's pharmacy:  [] yes  [x] no  Pharmacist Spoke With:     UA collected [x] yes  [] no    [place] Prescription Monitoring [place] Reviewed? [x] yes  [] no    Referrals/Labs were made to: None    Completed Charge Capture?  [x] yes  [] no    Future appointment was made: [] yes  [x] no    Dictation completed at time of chart check: [] yes  [x] no    I have checked the documentation for today s encounters and the above information has been reviewed and completed.

## 2021-06-08 NOTE — PROGRESS NOTES
Weekly Progress Note  Linnette Alas  1990  [id number]      D) Pt attended 3 groups  this week with 1 absences. A) Staff facilitated groups and reviewed tx progress. Assessed for [facility]. R) No VAP needed at this time. Pt working on the following dimensions:  Dimension #1 - Withdrawal Potential - . Level 0 no concern. Reason Risk Rating assigned: Client is back on  her suboxone and a behavior contract in the mental health clinic and in CD due to missed apts and inability to give an acceptable UA on 12/15/16. Both specimens were very diluted and absent of Buropronephrine. Dr. Hannah has put in place a very direct and specific guideline for her to follow to continue her treatment here in the AC Pt reports she has remained abstinent from all substances of abuse since discharge from unit [facility] on 10/16/16/. However Ua's and behavior is suspicious. At this time her UA's have been clean.  She does not report any withdrawals symptoms at this time.   Dimension #2 - Biomedical - concern.-Level 1, mild concern. Reason Risk Rating Assigned: Client had  2016. Pt reports she is following recommendations post childbirth and has access to medical services as needed. Missing follow up appointment in the mental health clinic causing lack of refill for suboxone. Short script for other mental health meds refilled until client sees a provider on 16 Client was not seen by  on Monday nor given suboxone. She followed up with Dr. Tijerina on 16 at which time she was put on a client contract that requires no missed appointments for clinic or treatment appointment, neg. UA that are not diluted any failure of this contract will mean immediate discharge from the clinic and treatment and referred to probation and civil commit proceedings 2017 The client has been following through as directed.  At this time the patient is not expressing any immediate medical or dental concerns.    Dimension #3 -  Emotional , Behavioral and Cognitive - concern. Level 2, moderate concern.Reason Risk Rating Assigned: Client reports experiencing symptoms of depression and anxiety, but is unaware of any mental health diagnosis/diagnoses despite being on two medications indicated for mental health prescribed by PCP. Client scored a low-risk on the PANSI. Client would like Mental Health DA from our clinic.Has an appointment to see Kait 1/6/2017 and return to therapy. Very unhealthy relationship with S/O, client reports it is hard to deal with.   Dimension #4 - Treatment Resistance - concern. Level 2, moderate concern.Reason Risk Rating Assigned:  Client was admitted to 2700 unit directly from long-term/work house for probation violation. Client's stated desire to change is incongruent with her actions. Client will be stepping down to two times per week. Client completed her use history in her treatment plan Not engaged in treatment activities and change thinking. Client will remain in treatment for extended time to aid in her understanding of her addiction. Significant motivation from probation, and CPS to stay in treatment and remain sober.  Dimension #5 - Relapse Potential - concern. Level 3, serious concern.Reason Risk Rating Assigned: Client working on relapse triggers/stressors. Pt remains highly vulnerable to relapse due to long hx of return to use and lack of coping skills.   Dimension #6 - Recovery Environment - concern. Level 3, serious concern. Reason Risk Rating Assigned:  Patient currently living in her new apartment that she obtained through [facility] [facility].  Patient is involved with [employer], but has little sober support at home. Pt has probationary obligations with [facility]. Pt is unemployed. Pt signed MALIA's for [employer] and PO. Client reported that she has obtained new housing through [employer] [employer] [facility], at common [facility] in [place], and has moved in. Client describes her  as alcoholic, and  "non supportive, contemplating ending that relationship.   T) Client educated on acceptance. Client has completed 82 of 84 hours of program at this time. Projected discharge date is 2/24/2017. Current discharge plan is service coordination group or relapse prevention.     Lisa Le        Psycho-Educational Curriculum  Date Attended  Psycho-Educational Curriculum  Date Attended    Acceptance   Shame/Guilt     1st Step   Anger/Rage     Affirmations   Mental Health     Automatic Negative Thoughts   Anxiety     Cross Addiction   Co-Occurring Disorders     Stages of Change   Hallie/Bipolar     Defense Mechanisms 1/9/17     Self Care 1/10/17     Self Esteem 1/11/17     DBT Distress Tolerance 1/12/17     Relapse   Trauma      Addictive Thoughts   Victim Identity     Coping Skills   Sober Structure     Relapse Prevention   Continuum of Care     Medical Aspects   Non-12 Step Support     Brain/Neurotransmitters   Priorities     Medication Compliance   Spirituality     KENTON Alcohol/Drug Research   Weekend Planner     Physical Health   Educational Videos     Post Acute Withdrawal   1st Step     Pregnancy and Drug Use   2nd Step     Sexual Health   Assertive Communication     Short-Term/Long-Term Effects   My name is David BLAND    Relationships   Cross Addiction     Assertive Communication   God As We Understood Him     Boundaries   HBO Relapse     Codependence    HBO What Is Addiction     Defense Mechanisms    Medical Aspects 1     Family Roles   Medical Aspects 2     Goodbye Letter   National Geographic: Stress     Intimacy   PBS Depression Out of the Shadows     Needs/Dealbreakers in Relationships   The Anonymous People    Socialization Skills   [place]     Feelings   Vinicius Roy \"Highjacked Brain\"    ABC Model of Emotion   Tommie Fischer Humor in Tx    Grief and Loss   The Mindfulness Movie    Healthy vs. Unhealthy Feelings   David BLAND documentary     Meditation/Mindfulness       Overconfidence/Complacency     "   Resentments       Stress

## 2021-06-08 NOTE — PROGRESS NOTES
Linnette Alas attended 3 hours of group therapy today.  Linnette graduated from the group today as well.    1/31/2017 3:16 PM Lisa Le

## 2021-06-08 NOTE — PROGRESS NOTES
Weekly Progress Note  Linnette Alas  1990  [id number]      D) Pt attended 2 groups  this week with 1  absences . Client had appointments with [employer] to sign lease for her new apartment. Client moved into subsidized housing at common [facility] in [place].  Court date set for    A) Staff facilitated groups and reviewed tx progress. Court date to review set for 17 Assessed for VA. R) No VAP needed at this time. Pt working on the following dimensions:  Dimension #1 - Withdrawal Potential - . Level 0 no concern. Reason Risk Rating assigned: Client is back on  her suboxone and a behavior contract in the mental health cl;inic and in CD due to missed apts and inability to give an acceptable UA on 12/15/16. Both specimens were very diluted and absent of Buropronephrine. Dr. Hannah has put in place a very direct and specific guideline for her to follow to continue her treatment here in the [facility]  Pt reports she has remained abstinent from all substances of abuse since discharge from unit 2700 on 10//16/16/.  However Ua's and behavior is suspicious.   Dimension #2 - Biomedical - concern.-Level 1, mild concern. Reason Risk Rating Assigned: Client had  2016. Pt reports she is following recommendations post childbirth and has access to medical services as needed. Missing follow up appointment in the mental health clinic causing lack of refill for suboxone. Short script for other mental health meds refilled until client sees a provider on 16 Client was not seen by  on Monday nor given suboxone. She followed up with Dr. Tijerina on 16 at which time she was put on a client contract that requires no missed appointments for clinic or treatment appointment, neg. UA that are not diluted any failure of this contract will mean immediate discharge from the clinic and treatment.and referred to probation and civil commit.preceedings.2017  The client has been  following through as directed   Dimension #3 - Emotional , Behavioral and Cognitive - concern. Level 2, moderate concern.Reason Risk Rating Assigned: Client reports experiencing symptoms of depression and anxiety, but is unaware of any mental health diagnosis/diagnoses despite being on two medications indicated for mental health prescribed by PCP. Client scored a low-risk on the PANSI. Client would like Mental Health DA from our clinic.Has an appointment to see Kait 1/6/2017   and return to therapy. Very unhealthy relationship with S/O, client reports it is hard to deal with.   Dimension #4 - Treatment Resistance - concern. Level 2, moderate concern.Reason Risk Rating Assigned:  Client was admitted to 2700 unit directly from senior living/work house for probation violation. Client's stated desire to change is incongruent with her actions.  Client will be stepping down to two times per week.  Client  completed her use history in her treatment plan   Not engaged in treatment activities and change thinking. Client will remain in treatment for extended time to aid in her understanding of her addiction. Significant motivation from probation, and CPS to stay in treatment and remain sober  Dimension #5 - Relapse Potential - concern. Level 3, serious concern.Reason Risk Rating Assigned: Client working on relapse triggers/stressors. Pt remains highly vulnerable to relapse due to long hx of return to use and lack of coping skills.   Dimension #6 - Recovery Environment - concern. Level 3, serious concern. Reason Risk Rating Assigned:  Patient currently living with boyfriend who she describes as an alcoholic, mother also drinks and uses cannabis. Patient is involved with [employer], but has little sober support at home. Pt has probationary obligations with [facility]. Pt is unemployed. Pt signed MALIA's for [employer] and PO. Client reported that she has obtained new housing through [employer], at common [facility] in [place], and has moved in..Client describes her  as alcoholic, and non supportive, contemplating ending that relationship.    T) Client educated on Sober Structure Client has tetyhtpll05 of 84 hours of program at this time. Projected discharge date is TBD Current discharge plan is relapse prevention group .     Sarah Mendez, CANDY-T    Psycho-Educational Curriculum  Date Attended  Psycho-Educational Curriculum  Date Attended    Acceptance   Shame/Guilt     1st Step   Anger/Rage  12/21/16   Affirmations   Mental Health     Automatic Negative Thoughts   Anxiety     Cross Addiction   Co-Occurring Disorders  12/27/16   Boundaries 11/21     DSM/ Diagnostic Criteria 11/23      Weekend Planner/ Relapse prevention planning  11/23 ACE's 12/29/16   Stages of Change   Hallie/Bipolar     Relapse   Trauma      Addictive Thoughts   Victim Identity  12/28/16   Coping Skills   Sober Structure     Relapse Prevention   Continuum of Care  1/4/17   Medical Aspects   Non-12 Step Support     Brain/Neurotransmitters   Priorities  1/5/17   Medication Compliance   Spirituality     K[place]ON Alcohol/Drug Research   Weekend Planner     Physical Health   Educational Videos     Post Acute Withdrawal   1st Step     Pregnancy and Drug Use   2nd Step     Sexual Health   Assertive Communication     Short-Term/Long-Term Effects   My name is David Ball   Cross Addiction     Assertive Communication  12/14/16 God As We Understood Him     Boundaries  12/14/16 HBO Relapse     Codependence   12/15/16 HBO What Is Addiction     Defense Mechanisms    Medical Aspects 1     Family Roles  12/15/16 Medical Aspects 2     Goodbye Letter   National Geographic: Stress     Intimacy   PBS Depression Out of the Shadows     Needs/Dealbreakers in Relationships   The Anonymous People    Socialization Skills   [place]     Feelings   Vinicius Roy \"Highjacked Brain\"    ABC Model of Emotion   Tommie Fischer Humor in Tx    Grief and Loss   The Mindfulness " Movie    Healthy vs. Unhealthy Feelings   David BLAND documentary     Meditation/Mindfulness  1/21     Overconfidence/Complacency       Resentments       Stress  12/19/16
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
## 2021-06-08 NOTE — PROGRESS NOTES
Weekly Progress Note  Linnette Alas  1990  [id number]    Transfer note to Service coordination  D) Pt attended 3 groups  this week with 1 excused absences. Client has completed IOP and will be moving into service coordination for further support.  A) Staff facilitated groups and reviewed tx progress. Assessed for VA. R) No VAP needed at this time. Pt working on the following dimensions:  Dimension #1 - Withdrawal Potential - . Level 0 no concern. Reason Risk Rating assigned: Client is back on her suboxone and a behavior contract in the mental health clinic and in CD due to missed apts and inability to give an acceptable UA on 12/15/16. Both specimens were very diluted and absent of Buropronephrine. Dr. Hannah has put in place a very direct and specific guideline for her to follow to continue her treatment here in the [facility] Pt reports she has remained abstinent from all substances of abuse since discharge from unit 2700 on 10/16/16/. However Ua's and behavior is suspicious. At this time her UA's have been clean. She does not report any withdrawals symptoms at this time.   Dimension #2 - Biomedical - concern.-Level 1, mild concern. Reason Risk Rating Assigned: Client had  2016. Pt reports she is following recommendations post childbirth and has access to medical services as needed. Missing follow up appointment in the mental health clinic causing lack of refill for suboxone. Short script for other mental health meds refilled until client sees a provider on 16 Client was not seen by  on Monday nor given suboxone. She followed up with Dr. Tijerina on 16 at which time she was put on a client contract that requires no missed appointments for clinic or treatment appointment, neg. UA that are not diluted any failure of this contract will mean immediate discharge from the clinic and treatment and referred to probation and civil commit proceedings 2017 The client has been  following through as directed. At this time the patient is not expressing any immediate medical or dental concerns.   Dimension #3 - Emotional , Behavioral and Cognitive - concern. Level 2, moderate concern.Reason Risk Rating Assigned: Client reports experiencing symptoms of depression and anxiety, but is unaware of any mental health diagnosis/diagnoses despite being on two medications indicated for mental health prescribed by PCP. Client scored a low-risk on the PANSI. Client would like Mental Health DA from our clinic.Very unhealthy relationship with S/O, client reports it is hard to deal with.   Dimension #4 - Treatment Resistance - concern. Level 2, moderate concern.Reason Risk Rating Assigned:  Client was admitted to 2700 unit directly from intermediate/work house for probation violation. Client's stated desire to change is incongruent with her actions. Client will be stepping down to two times per week. Client completed her use history in her treatment plan Not engaged in treatment activities and change thinking. Client will remain in treatment for extended time to aid in her understanding of her addiction. Significant motivation from probation, and CPS to stay in treatment and remain sober.  Dimension #5 - Relapse Potential - concern. Level 3, serious concern.Reason Risk Rating Assigned: Client working on relapse triggers/stressors. Pt remains highly vulnerable to relapse due to long hx of return to use and lack of coping skills. At this time the patient has not reported any use.    Dimension #6 - Recovery Environment - concern. Level 3, serious concern. Reason Risk Rating Assigned:  Patient currently living in her new apartment that she obtained through [employer].  Patient is involved with [employer], but has little sober support at home. Pt has probationary obligations with [facility]. Pt is unemployed. Pt signed MALIA's for [employer] and PO. Client reported that she has obtained new housing through  "[employer], [address] in [place], and has moved in. Client describes her  as alcoholic, and non supportive, contemplating ending that relationship.   T) Client educated on relapse prevention. Client has completed 101 of 84 hours of program at this time. Projected discharge date is 1/31/17 Current discharge plan is service coordination.     Sarah Mendez, ADC-T        Psycho-Educational Curriculum  Date Attended  Psycho-Educational Curriculum  Date Attended    Acceptance   Shame/Guilt     1st Step   Anger/Rage     Affirmations   Mental Health     Automatic Negative Thoughts   Anxiety     Cross Addiction   Co-Occurring Disorders     Stages of Change   Hallie/Bipolar     Relapse   Trauma      Addictive Thoughts   Victim Identity     Coping Skills   Sober Structure     Relapse Prevention   Continuum of Care     Behavior Chain Analysis 1/16/17     Relapse Scale 1/17/17     Relapse Warning Sings 1/17/17     ABC's Smart Recovery 1/18/17     Medical Aspects   Non-12 Step Support     Brain/Neurotransmitters   Priorities     Medication Compliance   Spirituality     KENTON Alcohol/Drug Research   Weekend Planner     Physical Health   Educational Videos     Post Acute Withdrawal   1st Step     Pregnancy and Drug Use   2nd Step     Sexual Health   Assertive Communication     Short-Term/Long-Term Effects   My name is David BLAND    Relationships   Cross Addiction     Assertive Communication   God As We Understood Him     Boundaries   HBO Relapse     Codependence    HBO What Is Addiction     Defense Mechanisms    Medical Aspects 1     Family Roles   Medical Aspects 2     Goodbye Letter   National Geographic: Stress     Intimacy   PBS Depression Out of the Shadows     Needs/Dealbreakers in Relationships   The Anonymous People    Socialization Skills   [place]     Feelings   Vinicius Roy \"Highjacked Brain\"    ABC Model of Emotion   Tommie Fischer Humor in Tx    Grief and Loss   The Mindfulness Movie    Healthy " vs. Unhealthy Feelings   David BLAND documentary     Meditation/Mindfulness  1/19/17     Overconfidence/Complacency       Resentments       Stress

## 2021-06-09 NOTE — PROGRESS NOTES
Chart was reviewed.  Concur with RN assessment and medications prescribed until next scheduled f/u.

## 2021-06-09 NOTE — PROGRESS NOTES
"Addiction  Nurse Only Visit Note    3/1/2017  Date of last visit: 2/16/17    Reason for today's visit:   Chief Complaint   Patient presents with     Follow-up       Vitals:    03/01/17 1109   BP: 129/72   Patient Site: Right Arm   Patient Position: Sitting   Cuff Size: Adult Regular   Pulse: 80   Resp: 18   Temp: 98.8  F (37.1  C)   TempSrc: Oral   Weight: (!) 247 lb (112 kg)   Height: 5' 7\" (1.702 m)        If having pain, who will address pain:  none    Is pt assisted: No    Urine for toxicology sent today: yes    Last UA date: 2/16/17  Reviewed with patient: yes  Results: negative    AIMS:     Pill count: No  (Controlled substance only)  Medications needing renewal: see meds and orders    Substance use history:    Using alcohol:No  Using street drugs or unprescribed medications: No  Using opioids other that Suboxone:No  Using tobacco:Yes: Describe: 1/2 pack a day    Recovery environment:  Attending formal chemical dependency programming: Meeting with Simba in the Clinic once a week - relapse prevention and support.   Attending \"outside\" mutual help meetings: Yes: Describe: NA meetings twice week  Has a chemical dependency sponsor: No  Has other elements of structure: Yes: Describe: Sees Keira in the Clinic twice monthly  Current Living Situation: living with son    Cravings: no    Sleep: yes 6 to 8 hours a night depending if her signicant other stays the night because of his snoring    Depression: no    Anxiety: yes 5/10 - manageable    Panic Attacks: no    Paranoia: no      Hallucinations: no      Suicidal Ideation: no    Homicidal Ideation:no  Comments: none     Physical Problems: none    Patient Update: Patient presented for Nurse Only appointment on-time, well groomed with bright affect. She reported the Suboxone working well, no cravings. Patient continues to see Simba in the Clinic for relapse prevention and support on a weekly basis. She is additionally attending NA/AA twice weekly. Patient reported she " will be getting custody of her 5 month old son in four to five weeks. She said she currently sees the child twice weekly and attends parenting classes. Patient said she has been using the clonidine up to 3 times a day for severe anxiety. She said she has not had any problem with light headedness with this medication. Patient requests Suboxone, Clonidine and Trazodone be refilled.     Next appointment with Dr. Hannah on 3/16/17.    Will discuss medication refills with Dr. Miles.     [place]  reviewed, no worrisome activity noted.     [employer] Information [place] Date: 17  [employer] Query Report Page#: 1  Patient Rx History Report  BISHOP ALVARES  Search Criteria: Last Name 'Bishop' and First Name 'Linnette' and  =  and Request Period =  to  ' - 9 out of 9 Recipients Selected.  Fill Date Product, Str, Form Qty Days Pt ID Prescriber Written RX# N/R* Pharm MED+  ---------- -------------------------------- ------ ---- --------- ---------- ---------- ------------ ----- --------- ------  2017 SUBOXONE 8 MG-2 MG SL FILM 30.00 15 [id number] 2017 [id number] N [id number] 480.0  2017 SUBOXONE 8 MG-2 MG SL FILM 30.00 15 [id number] 2017 [id number] N [id number] 480.0  2017 SUBOXONE 8 MG-2 MG SL FILM 26.00 15 [id number] 2017 [id number] N [id number] 416.0  2017 SUBOXONE 8 MG-2 MG SL FILM 4.00 2 [id number] 2017 [id number] N [id number] 480.0  2017 SUBOXONE 8 MG-2 MG SL FILM 14.00 7 [id number] 2017 [id number] N [id number] 480.0  2017 GABAPENTIN 400 MG CAPSULE 90.00 30 [id number] 2017 [id number] N [id number] 00.0  2017 SUBOXONE 8 MG-2 MG SL FILM 16.00 8 [id number] 2017 [id number] N [id number] 480.0  2016 SUBOXONE 8 MG-2 MG SL FILM 14.00 7 [id number] 2016 [id number] N [id number] 480.0  2016 SUBOXONE 8 MG-2 MG SL FILM 14.00 7 [id number] 2016 [id number] N  [id number] 480.0  12/19/2016 LORAZEPAM 0.5 MG TABLET 9.00 3 [id number] 12/19/2016 [id number] N [id number] 00.0  12/19/2016 GABAPENTIN 400 MG CAPSULE 21.00 7 [id number] 12/15/2016 [id number] N [id number] 00.0  12/06/2016 BUPRENORPHINE 8 MG TABLET SL 4.00 2 [id number] 12/05/2016 [id number] N [id number] 480.0  11/19/2016 GABAPENTIN 400 MG CAPSULE 90.00 30 [id number] 10/27/2016 [id number] R [id number] 00.0  11/14/2016 BUPRENORPHINE 8 MG TABLET SL 42.00 21 [id number] 11/14/2016 [id number] N [id number] 480.0  10/27/2016 GABAPENTIN 400 MG CAPSULE 90.00 30 [id number] 10/27/2016 [id number] N [id number] 00.0  10/27/2016 BUPRENORPHINE 8 MG TABLET SL 36.00 18 [id number] 10/27/2016 [id number] N [id number] 480.0  10/16/2016 GABAPENTIN 400 MG CAPSULE 30.00 10 [id number] 10/16/2016 [id number] N [id number] 00.0  10/16/2016 BUPRENORPHINE 8 MG TABLET SL 26.00 13 [id number] 10/13/2016 [id number] N [id number] 480.0  09/24/2016 BUPRENORPHINE 8 MG TABLET SL 21.00 14 [id number] 09/24/2016 [id number] N [id number] 360.0  08/11/2016 BUPRENORPHINE 8 MG TABLET SL 36.00 15 [id number] 08/11/2016 [id number] N [id number] 576.0  07/28/2016 BUPRENORPHINE 8 MG TABLET SL 35.00 14 [id number] 07/28/2016 [id number] N [id number] 600.0  07/15/2016 BUPRENORPHINE 8 MG TABLET SL 18.00 8 [id number] 07/15/2016 [id number] N [id number] 540.0  06/27/2016 BUPRENORPHINE 8 MG TABLET SL 55.00 22 [id number] 06/23/2016 [id number] N [id number] 600.0  06/19/2016 BUPRENORPHINE 8 MG TABLET SL 13.00 6 [id number] 06/17/2016 [id number] N [id number] 520.0  06/07/2016 BUPRENORPHINE 8 MG TABLET SL 38.00 16 [id number] 06/02/2016 [id number] N [id number] 570.0  05/27/2016 BUPRENORPHINE 8 MG TABLET SL 18.00 8 [id number] 05/27/2016 [id number] N [id number] 540.0  04/18/2016 BUPRENORPHINE 8 MG TABLET SL 14.00 7 [id number] 04/18/2016 [id number] N [id number] 480.0  03/31/2016 BUPRENORPHINE 8 MG TABLET SL 16.00 8  [id number] 03/31/2016 [id number] N [id number] 480.0  03/24/2016 BUPRENORPHINE 8 MG TABLET SL 16.00 8 [id number] 03/24/2016 [id number] N [id number] 480.0  03/14/2016 BUPRENORPHINE 8 MG TABLET SL 16.00 8 [id number] 03/14/2016 [id number] N [id number] 480.0  03/11/2016 BUPRENORPHINE 8 MG TABLET SL 6.00 4 [id number] 03/11/2016 [id number] N [id number] 360.0  *N/R N=New R=Refill  +MED Daily  Prescribers for prescriptions listed  ----------------------------------------------------------------------------------------------------------------------------------  [id number] KIRAN RAGSDALE K, MD; [address], [place] [address]  [id number] [facility]; [address] [facility] [address]  [id number] HERMINIA DALY MD,FACEP; EMERGENCY DEPARTMENT, [facility], [address] [place] [address]  [id number] DAVID OMER MD; [facility], [address], [place] [address]  [id number] RACHEL BURGOS (PA-C); [facility], [address], [place] [address]  Report Disclaimers:  The report provided above is based upon the search criteria and the data provided by the dispensing entities. For more information  about any prescription, please contact the dispenser or the prescriber.  This report contains confidential information, including patient identifiers, and is not a public record. The information on this  report must be treated as protected health information and is to be disclosed to others only as authorized by applicable state  and Federal regulations.    Education Done Today  Patient Instructions:     Continue Medications as ordered.  No alcohol or unprescribed drug use.  No driving, if sedated.  Come to the Emergency Room if not feeling safe.  Call the clinic with any questions [phone].  If you identify that you are pregnant, please call us to inform us, as medications may need to be changed.  You can also contact Urgent Care  Crisis Line at [phone] 24 hours a day for help.  Follow up as directed, for your appointments, per your After Visit Summary Form.    David J Myhre    3/1/2017 11:16 AM
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
## 2021-06-09 NOTE — PROGRESS NOTES
[facility]  Mental Health and Addiction Care  [facility], [facility], and [facility] [facility] [employer]   [phone] or [phone]    MASTER PLAN    Patient Name: Sofia Alas   MRN:[id number]  Counselor: Simba Amezcua MA, [facility]    Title: Dimension 1-Withdrawal Potential, Risk Level 0   Diagnosis:   Opioid Use Disorder-Severe (F11.20) (304.00)    Problem: No withdrawal signs or symptoms observed or reported at this time.  Patient is on opioid replacement therapy at [facility].  Patient is closely monitored by Suboxone prescriber Dr. Hannah and his nurses.  Treatment plan for this dimension is not needed at this time.        Title: Dimension -Biomedical Condition, Risk Level 0  Nicotine User: no , Amount per day: none   Problem: Pt reports her health is good at this time.  Client had  2016 and staying healthy at this time.      Title: Dimension 3-Emotional/Behavioral/Cognitive Conditions and Complications, Risk Level  2  Problem: Patient reports slight depressed mood at the time of case transfer.  Pt reports she just moved into new apartment in January and trying to adjust to new life while taking care of her 2 year old full time.  Patient s younger son is under care by her mother.    Goal: To learn to cope with all external/internal stress, gain coping skills for mental health symptoms and stay sober.      Begin Date: 2016  Target Date: 2017  Date Completed:    /   /     Method 1: I will complete diagnostic assessment with psychotherapist Kait Kenny and follow her recommendations on individual therapy.    Begin Date: 2017   Target Date: 2017   Date Completed:    /   /     Method 2: I will maintain my suboxone appointment at [facility].   Begin Date: 2017   Target Date:  2017  Date Completed:    /   /     Method 3: I will meet with my /counselor weekly or bi-weekly to check in how I am doing with  my recovery.    Begin Date: 2/8/2017  Target Date: 5/30/2017   Date Completed:    /   /    Title: Dimension 4-Treatment Acceptance/Resistance, Risk Level 2  Probation involvement: [facility]   Problem: Patient admits that she is on probation with [facility] and also has open case with [facility] Child Protecting.  Patient states she has been compliant with both and doing everything she can to reunite with her new born.    Goal: to become more accountable for my own life and to take initiative on my sown recovery from chemical use  Begin Date: 2/8/2017  Target Date: 5/30/2017   Date Completed:    /   /    Method 1: I will work with both [facility] Probation and [facility] Child Protection and comply with their orders.    Begin Date: 2/8/2017   Target Date: 5/30/2017   Date Completed:    /   /     Method 2: I will continue to work with parenting educator and prepare for reunification.    Begin Date: 2/8/2017  Target Date: 5/30/2017   Date Completed:    /   /     Method 3: I will turn in clean UAs   Begin Date: 2/8/2017   Target Date: 5/30/2017  Date Completed:    /   /    Title: Dimension 5-Relapse/Continued Use/Continued Problem Potential, Risk Level 2  Problem: Pt reports several life stressors related to child rearing, housing, family relationship, lack of day care access and future plan for herself in terms f career.   Patient seems slightly overwhelmed with the same reason.    Goal: Gain more knowledge and build skills for relapse prevention.    Begin Date: 2/8/2017   Target Date: 5/30/2017  Date Completed:    /   /     Method 1: I will find a day care for my son so I can take time for myself.    Begin Date: 2/8/2017   Target Date: 5/30/2017   Date Completed:    /   /     Method 2: Once I find a day care or available , I will attend community support group to see if that helps me stay sober.    Begin Date: 2/8/2017   Target Date: 5/30/2017  Date Completed:    /   /     Method 3: I will  gain and practice meditation skills by working with my .   Begin Date: 2/8/2017  Target Date: 5/30/2017   Date Completed:    /   /    Title: Dimension 6-Recovery Environment, Risk Level 2  Problem: Patient recently moved into a family supportive housing.  Patient s new born is under care by patient s mother due to pending child protection case.  Patient has a significant other but he is dealing with his own probation and stays at his parents .  Pt reports her life is tied with her 2 year old at this time and difficult to find a time for herself.    Goal: Integrate sober life knowledge into my day-to-day life and engaged in healthier structured life style that supports recovery from chemical use.      Begin Date: 2/8/2017   Target Date: 5/30/2017  Date Completed:    /   /     Method 1: I will continue to work with [employer] where they provide parenting information/resource/supplies and support for mothers in need.     Begin Date: 2/8/2017   Target Date: 5/30/2017   Date Completed:    /   /     Method 2: I will work with a telephone  who will call me weekly to check on my sobriety.    Begin Date: 2/8/2017  Target Date: 5/30/2017  Date Completed:    /   /     Date Completed:    /   /        By signing this document, I am acknowledging that I was actively and directly involved in the development of my treatment plan.    Client Signature:____________________________________________ Date: 2/8/2017     Staff Signature: ____________________________________________ Date:  2/8/2017  Counselors:  CINTIA Velázquez MA

## 2021-06-09 NOTE — PROGRESS NOTES
D)1:1  A)Pt came to 1:1 with her 2 year old son Moreno.  Pt update on her life and how things are going.  Moreno started in-house day care yesterday for about 1 hour a day.  Pt said this gives her time to organize and clean her apartment.  Pt said Moreno had an childhood assessment and was recommended for speech therapy.  The therapy will be once a week at home three times a month.  Pt said his boyfriend is still working at the bed factory and now training others.  Pt said he received tax return and bought her a nice leather sectional couch and new TV.  Pt reports her mother is doing well and hoping she can start driving [place]. Patient states she is still sober.  Pt seems she is making progress.

## 2021-06-09 NOTE — PROGRESS NOTES
Weekly Progress Note  Linnette Alas  1990  [id number]        D) Patient had one 1:1 session this week.  Pt came for her therapy appointment as well.   Patient completed CD Day outpatient and was transferred to Service Coordination.     A) Staff facilitated groups and reviewed tx progress. Assessed for [facility]. Case was staffed with Issa Sotelo MA [facility].   R) No VAP needed at this time. Pt working on the following dimensions:  Dimension #1 - Withdrawal Potential, Risk Level 0.  Patient is on Subxone prescribed by Dr. Huerta.  Pt is being closely monitored by clinic and coming to appointments.  Pt reported no concern for withdrawals this week.      Dimension #2 - Biomedical - concern.-Level 0.  Patient had  2016. Patient did not report medical issues this week and said her health is in good condition.   Dimension #3 - Emotional , Behavioral and Cognitive - concern. Level 2, Patient regularly comes to clinic for subxone appointment and therapy.  Patient seems to be making progress in her life and complying with probation and child protection.   Dimension #4 - Treatment Resistance - concern. Level 1.  Patient has been coming to all appointments (nurse, therapy, 1:1 with ) on time and showing increased sense of responsibility.    Dimension #5 - Relapse Potential - concern. Level 2.  Patient completed CD Day Outpatient program and was transferred to Service Coordination.  Patient is seeing her mental health therapist and staying on Suboxone as ordered.     Dimension #6 - Recovery Environment - concern. Level 2, Pt reports she is utilizing her apartment's in-house day care for Moreno. Pt seemed concerned of Moreno's slow speech and he will start weekly speech therapy.  Pt reports her boyfriend bought her brand new couch and TV and wants him to get in her lease as well.   Pt is not reporting any concern at this time.    T) Patient completed 101 of Day Outpatient program and 5 hour of Service  "Coordination so far.               Psycho-Educational Curriculum  Date Attended  Psycho-Educational Curriculum  Date Attended    Acceptance    Shame/Guilt      1st Step    Anger/Rage      Affirmations    Mental Health      Automatic Negative Thoughts    Anxiety      Cross Addiction    Co-Occurring Disorders      Stages of Change    Hallie/Bipolar      Relapse    Trauma       Addictive Thoughts    Victim Identity      Coping Skills   2/8 Sober Structure      Relapse Prevention    Continuum of Care      Behavior Chain Analysis 3/15       Relapse Scale        Relapse Warning Sings        ABC's Smart Recovery        Medical Aspects    Non-12 Step Support      Brain/Neurotransmitters    Priorities      Medication Compliance    Spirituality      KENTON Alcohol/Drug Research    Weekend Planner      Physical Health    Educational Videos      Post Acute Withdrawal    1st Step      Pregnancy and Drug Use    2nd Step      Sexual Health    Assertive Communication      Short-Term/Long-Term Effects    My name is David BLAND     Relationships    Cross Addiction      Assertive Communication    God As We Understood Him      Boundaries    HBO Relapse      Codependence     HBO What Is Addiction      Defense Mechanisms     Medical Aspects 1      Family Roles    Medical Aspects 2      Goodbye Letter    National Geographic: Stress      Intimacy    PBS Depression Out of the Shadows      Needs/Dealbreakers in Relationships    The Anonymous People     Socialization Skills    [place]      Feelings    Vinicius Roy \"Highjacked Brain\"    ABC Model of Emotion    Tommie Fischer Humor in Tx     Grief and Loss    The Mindfulness Movie     Healthy vs. Unhealthy Feelings    David BLAND documentary      Meditation/Mindfulness         Overconfidence/Complacency     Parenting and Recovery   2/15, 2/22   Resentments          Stress               "

## 2021-06-09 NOTE — PROGRESS NOTES
D)1:1 at patient's apartment   A)This writer visited patient at her apartment at 10 am.   R)Pt said she was nervous about this afternoon's criminal court for her vanegas theft.  Pt shared that she stole $10 worth of items last May out of desperation to support her family.  Pt said it has been 10 months and she has not gotten into any other legal troubles and hoping they would drop the charge.  Patient updated on her child protection and her 4 months old baby, Praveena. Pt said she meets Barbara on Tuesdays for several hours while parenting education also observes. Pt said her mother was babysitting both kids the other day and got very tired and asked.  Pt originally asked her mother to watch both kids while she goes to this afternoon's court, but her mother said she could not handle 2 kids at the same time.  Pt said she has no option but bring her 2 year old to boyfriend's mother's.  Pt said this is always the last option since she is a acholic and drinks all day long.  Patient's apartment looked organized and enough toys around. 2 year old Moreno was all over running and playing.  Pt said she applied for day care and waiting for result.  Pt said until he goes into day care, her life is completely tied to watching her busy 2 year old 24/7.  This writer agreed.  The 2 year old was very handful with his curiosity and constantly getting into things.     T)schedule another 1:1

## 2021-06-09 NOTE — PROGRESS NOTES
This therapist contacted patient after she no showed for her appointment. Patient indicated thinking it was at 1130. Patient scheduled another appointment for 1100 on 3/17

## 2021-06-09 NOTE — PROGRESS NOTES
Weekly Progress Note  Linnette Alas  1990  [id number]        D) Patient had one 1:1 session at her home on .  Pt came for her therapy appointment as well.   Patient completed CD Day outpatient and was transferred to Service Coordination.     A) Staff facilitated groups and reviewed tx progress. Assessed for [facility]. Case was staffed with Issa Sotelo MA [facility].   R) No VAP needed at this time. Pt working on the following dimensions:  Dimension #1 - Withdrawal Potential, Risk Level 0.  Patient is back on her suboxone and a behavior contract in the mental health clinic and in CD due to missed appointments and inability to give an acceptable UA on 12/15/16. Both specimens were very diluted and absent of Buropronephrine. Dr. Hannah has put in place a very direct and specific guideline for her to follow to continue her treatment.   Pt reports she has remained abstinent from all substances of abuse since discharge from [facility] on 10/16/16/. However UA's and behavior is suspicious. At this time her UA's have been clean. She does not report any withdrawals symptoms at this time.   Dimension #2 - Biomedical - concern.-Level 0.  Patient had  2016. Pt reports she is following recommendations post childbirth and has access to medical services as needed.  Dr. Tijerina placed patient on Behavioral Contrat on 16 and noo missed appointments for clinic or treatment appointment allowed and submit clean  UA that are not diluted.  Any failure of this contract will mean immediate discharge from the clinic and treatment and referred to probation and civil commit proceedings.  The client has been following through as directed. At this time the patient is not expressing any immediate medical or dental concerns.   Dimension #3 - Emotional , Behavioral and Cognitive - concern. Level 2, Patient completed diagnostic assesmentwith Kait Backer on 17 and seeing the therapist weekly.    Patient said she is  asked by Dr. Tijerina to attend 2 support groups per week but she expressed that having 2 year old makes it difficult unless she can arrange someone to watch her kids. Pt said she is seeing her infant on Tuesday while being monitored by parenting educator.  Patient's 2 year old son is very curious and active.  Pt said she applied for day care and waiting for result.  Pt said family  is limited and her own mother admitted it was too much to babysit 2 year old and 4 months old at the same time.    Dimension #4 - Treatment Resistance - concern. Level 2.  Patientt was admitted to 2700 unit directly from [facility]/work house for probation violation.  Patient completed CD Day outpatient program and was transferred to Service Coordination .   This week, patient came to [facility] twice-service coordination and therapy and demonstrated her determination to close the child protection by May.    Dimension #5 - Relapse Potential - concern. Level 2.  Patient completed CD Day Outpatient program and was transferred to Service Coordination.  Pt does not need to attend Service Coordination, but she will work with  individually on a weekly basis. Patient had a criminal hearing on Wednesday for her vanegas theft that occurred May 2016.  Pt said she has been off any legal problem since then and hoping the sentencing will cnsider all the good work she has done since then.    Dimension #6 - Recovery Environment - concern. Level 2, serious concern. Reason Risk Rating Assigned:  Patient currently living in her [facility] apartment that she obtained through [employer]. Patient also works with parenting educator every Tuesdays. The apartment building has  on staff and quick access to many help including in-house day care.  Pt states her goal is to get [address] voucher in one year and move out.    T) Patient completed 101 of Day Outpatient program and 4 hour of Service Coordination so far.   "             Psycho-Educational Curriculum  Date Attended  Psycho-Educational Curriculum  Date Attended    Acceptance    Shame/Guilt      1st Step    Anger/Rage      Affirmations    Mental Health      Automatic Negative Thoughts    Anxiety      Cross Addiction    Co-Occurring Disorders      Stages of Change    Hallie/Bipolar      Relapse    Trauma       Addictive Thoughts    Victim Identity      Coping Skills   2/8 Sober Structure      Relapse Prevention    Continuum of Care      Behavior Chain Analysis        Relapse Scale        Relapse Warning Sings        ABC's Smart Recovery        Medical Aspects    Non-12 Step Support      Brain/Neurotransmitters    Priorities      Medication Compliance    Spirituality      KENTON Alcohol/Drug Research    Weekend Planner      Physical Health    Educational Videos      Post Acute Withdrawal    1st Step      Pregnancy and Drug Use    2nd Step      Sexual Health    Assertive Communication      Short-Term/Long-Term Effects    My name is David BLAND     Relationships    Cross Addiction      Assertive Communication    God As We Understood Him      Boundaries    HBO Relapse      Codependence     HBO What Is Addiction      Defense Mechanisms     Medical Aspects 1      Family Roles    Medical Aspects 2      Goodbye Letter    National Geographic: Stress      Intimacy    PBS Depression Out of the Shadows      Needs/Dealbreakers in Relationships    The Anonymous People     Socialization Skills    [place]      Feelings    Vinicius Roy \"Highjacked Brain\"    ABC Model of Emotion    Tommie Fischer Humor in Tx     Grief and Loss    The Mindfulness Movie     Healthy vs. Unhealthy Feelings    David BLAND documentary      Meditation/Mindfulness         Overconfidence/Complacency     Parenting and Recovery   2/15, 2/22   Resentments          Stress               "

## 2021-06-09 NOTE — PROGRESS NOTES
MENTAL HEALTH VISIT NOTE  03/17/2017  Start time: 1120    Stop Time: 1200   Session # 1    Linnette Alas is a 26 y.o. female is being seen today for    Chief Complaint   Patient presents with     Follow-up     New symptoms or complaints: None    Functional Impairment:   Personal: 3  Family: 4  Work: 1  Social:1    Clinical assessment of mental status: Linnette Alas presented on time.  She was open and cooperative, and dressed appropriately for this session and weather. Her memory was intact.  Her  speech was intact.  Language was intact.  Concentration and focus is within normal limits. Psychosis is not noted or reported. She reports her mood is depressed.  Affect is congruent with speech.  Fund of knowledge is adequate. Insight is adequate for therapy.     Suicidal/Homicidal Ideation present: None Reported This Session    Patient's impression of their current status: Patient reported doing okay. Patient indicated she continues to miss her youngest son. Patient talked about it being hard for her to enjoy activities with her family knowing her youngest son is missing out. Patient indicated she has been working on staying positive and making positive changes in her life.     Therapist impression of patients current state: Patient appears to have fair insight into her mental health. This therapist processed with patient ways she is working on making changes in her life to get her son back. This therapist processed with patient coping skills to use to help reduce her depression symptoms.     Type of psychotherapeutic technique provided: Insight oriented, Client centered and CBT    Progress toward short term goals: Progress as expected with medication management and returning to scheduled session.     Review of long term goals: Long-term goals reviewed  03/08/2017    Diagnosis:   1. Major depressive disorder, recurrent episode, mild    2. DORENE (generalized anxiety disorder)    3. Opioid use disorder, mild, abuse       Plan and Follow up: Patient will return in two weeks for scheduled session. Patient will continue to benefit from one on one therapy. Patient will benefit from using coping skills taught in session to reduce mental health symptoms.     Discharge Criteria/Planning: Patient will continue with follow-up until therapy can be discontinued without return of signs and symptoms.    Kait Backer 3/17/2017

## 2021-06-09 NOTE — PROGRESS NOTES
Addiction Medicine  Outpatient Visit  Dr. Hannah    3/16/2017    Linnette JAVIER Bishop, 1990.    Subjective   Patient was seen today in follow-up for opiate use disorder, severe.    Last UDS performed on 3/1/17 and was: negative  Last BUP performed on 3/1/7 and was: positive     UA collected    [place]  program [place] was [place]. There has been no worrisome pharmacy activity in the past 3 month      How is life going?  Are there any significant stressors? Any significant depression? Are you working or going to school?  Pt states things at home are going good, no stress. Having a trial visit at home soon with baby and hoping to have baby at home before going to court.       Have you maintained sobriety from all substances? Are you having any cravings?  From 0 to 5 (zero being none) grade your craving. If not what is your problem substance and frequency?   Yes has been sober, no cravings.       What is the long term plan for suboxone?    Stay on medication until feels comfortable enough not being on it.         Is the medication causing any somnolence or significant side effects?  None           Addiction History:  This is substantially documented elsewhere.    Recovery Environment:  This is substantially documented elsewhere.    Allergies:  Blood-group specific substance      Medications:  Current Outpatient Prescriptions   Medication Sig Dispense Refill     buprenorphine-naloxone (SUBOXONE SL FILM) 8-2 mg Film per sublingual film One film sublingually twice daily , Dr. Miles covering for Dr. Hannah 28 Film 0     cloNIDine HCl (CATAPRES) 0.1 MG tablet TAKE 1 TABLET p.o. Every 8 - 12 hours for control of severe anxiety. Reduce or stop if extreme light-headed occurs 40 tablet 0     doxylamine (UNISON) 25 mg tablet Take 1 tablet (25 mg total) by mouth bedtime as needed for sleep. 30 tablet 0     gabapentin (NEURONTIN) 400 MG capsule Take 1 capsule (400 mg total) by mouth 3 (three) times a day. 90 capsule  0     hydrOXYzine (VISTARIL) 100 MG capsule Take 1 capsule (100 mg total) by mouth 3 (three) times a day as needed for anxiety. 90 capsule 0     traZODone (DESYREL) 100 MG tablet Take 1 tablet (100 mg total) by mouth bedtime. 30 tablet 0     No current facility-administered medications for this visit.          Physical Examination:  Visit Vitals     LMP 2016     Physical Exam  Documented elsewhere        [place] Prescription Monitoring Program:  Reviewed and no worrisome pharmacy activity noted    SAHA GIORGIO  Search Criteria: Last Name thomas' and First Name tom' and  =  and Request Period =  to  ' - 9 out of 9 Recipients Selected.  Fill Date Product, Str, Form Qty Days Pt ID Prescriber Written RX# N/R* Pharm Pay MED+  ---------- -------------------------------- ------ ---- --------- ---------- ---------- ------------ ----- --------- ---------- ------  2017 SUBOXONE 8 MG-2 MG SL FILM 28.00 14 [id number] 2017 [id number] N [id number] 480.0  2017 SUBOXONE 8 MG-2 MG SL FILM 30.00 15 [id number] 2017 [id number] N [id number] 480.0  2017 SUBOXONE 8 MG-2 MG SL FILM 30.00 15 [id number] 2017 [id number] N [id number] 480.0  2017 SUBOXONE 8 MG-2 MG SL FILM 26.00 15 [id number] 2017 [id number] N [id number] 416.0  2017 SUBOXONE 8 MG-2 MG SL FILM 4.00 2 [id number] 2017 [id number] N [id number] 480.0  2017 SUBOXONE 8 MG-2 MG SL FILM 14.00 7 [id number] 2017 [id number] N [id number] 480.0  2017 GABAPENTIN 400 MG CAPSULE 90.00 30 [id number] 2017 [id number] N [id number] 00.0  2017 SUBOXONE 8 MG-2 MG SL FILM 16.00 8 [id number] 2017 [id number] N [id number] 480.0  2016 SUBOXONE 8 MG-2 MG SL FILM 14.00 7 [id number] 2016 [id number] N [id number] 480.0  2016 SUBOXONE 8 MG-2 MG SL FILM 14.00 7 [id number] 2016 [id number] N [id number]  480.0  2016 LORAZEPAM 0.5 MG TABLET 9.00 3 [id number] 2016 [id number] N [id number] 00.0  2016 GABAPENTIN 400 MG CAPSULE 21.00 7 [id number] 12/15/2016 [id number] N [id number] 00.0  *N/R N=New R=Refill  +MED Daily  Prescribers for prescriptions listed  ----------------------------------------------------------------------------------------------------------------------------------  [id number] SAJAN-QUITA BACH; [address] [facility] [address]  [id number] HERMINIA DALY MD,FACEP; EMERGENCY DEPARTMENT, [facility], [address] [place] [address]  [id number] DAVID OMER MD; [facility], [address], [place] [address]  [id number] ZOEY SARABIA MD; [facility], [address] [place] [address]  Pharmacies that dispensed prescriptions listed  ----------------------------------------------------------------------------------------------------------------------------------  [id number] [facility] : [facility] # [id number], [address],[facility] [address],  [id number] [employer] : [facility] # [id number], [address] [facility] [address],  Patients that match search criteria  ----------------------------------------------------------------------------------------------------------------------------------  [id number] YIFAN ALVARES,  88; [address] [facility] [address]  [id number] YIFAN ALVARES,  [birth year]; [address] [facility] , [facility] [address]  [id number] YIFAN ALVARES,  [birth year]; [id number], [facility] [address]  [id number] YIFNA JAVIER,  89; [address], [place] [address]  [id number] YIFAN ALVARES,  [birth year]; [address], [place] [address]  [id number] YIFAN JAVIER,  [birth year]; GENERAL DELIVERY, [facility] [address]  [id number] YIFAN ALVARES,  [birth year]; [address], [place] [address]  [id number] YIFAN ALVARES,  [birth year]; [address], [place]  [address]  [id number] YIFAN ALVARES,  [birth year]; [address], [place] [address]  MED Summary  This section displays cumulative MED values by unique recipient. The MED Max value is the maximum occurrence of cumulative MED      Correct pharmacy verified with patient and confirmed in snapshot? [x] yes []no    Medications Phoned  to Pharmacy [] yes [x]no  Name of Pharmacist:  List Medications, including dose, quantity and instructions      Medication Prescriptions given to patient   [] yes  [x] no   List the name of the drug the prescription was written for.      Medications ordered this visit were e-scribed.  Verified by order class [x] yes  [] no  Suboxone, clonidine, gabapentin    Medication changes or discontinuations were communicated to patient's pharmacy:  [] yes  [x] no  Pharmacist Spoke With:     UA collected [x] yes  [] no    [place] Prescription Monitoring [place] Reviewed? [x] yes  [] no    Referrals/Labs were made to: None    Completed Charge Capture?  [x] yes  [] no    Future appointment was made: [] yes  [x] no    Dictation completed at time of chart check: [] yes  [x] no    I have checked the documentation for today s encounters and the above information has been reviewed and completed.

## 2021-06-09 NOTE — PROGRESS NOTES
Patient was seen today in follow-up for opiate dependence    Last UDS performed on 3/16/17 and was: negative  Last BUP performed on  3/1/17 and was positive  Doing well and has been stable from an addiction standpoint.     Correct pharmacy verified with patient and confirmed in snapshot? [x] yes []no    Medications Phoned  to Pharmacy [] yes [x]no  Name of Pharmacist:  List Medications, including dose, quantity and instructions      Medication Prescriptions given to patient   [] yes  [x] no   List the name of the drug the prescription was written for.       Medications ordered this visit were e-scribed.  Verified by order class [x] yes  [] no  Suboxone  Medication changes or discontinuations were communicated to patient's pharmacy: [] yes  [x] no    UA collected [x] yes    [] no    [place] Prescription Monitoring Program Reviewed? [] yes  [x] no    Referrals were made to:  none  Future appointment was made: [x] yes  [] no  4/13/17  Dictation completed at time of chart check: [x] yes  [] no    I have checked the documentation for today s encounters and the above information has been reviewed and completed.

## 2021-06-09 NOTE — PROGRESS NOTES
Outpatient Mental Health Treatment Plan    Name:  Linnette Alas  :  1990  MRN:  [id number]    Treatment Plan:  Initial Treatment Plan  Intake/initial treatment plan date:  2017  Benefit and risks and alternatives have been discussed: Yes  Is this treatment appropriate with minimal intrusion/restrictions: Yes  Estimated duration of treatment:  Approximately 24 sessions.  Anticipated frequency of services:  Every 2 weeks  Necessity for frequency: This frequency is needed to establish therapeutic goals and for continuity of care in order to monitor progress.  Necessity for treatment: To address cognitive, behavioral, and/or emotional barriers in order to work toward goals and to improve quality of life.    Plan:           ?   ? Anxiety    Goal:  Decrease average anxiety level from 3 to 1.   Strategies: ? [x]Learn and practice relaxation techniques and other coping strategies (e.g., thought stopping, reframing, meditation)     ? [x] Increase involvement in meaningful activities     ? [x] Discuss sleep hygiene     ? [x] Explore thoughts and expectations about self and others     ? [x] Identify and monitor triggers for panic/anxiety symptoms     ? [x] Implement physical activity routine (with physician approval)     ? [x] Consider introduction of bibliotherapy and/or videos     ? [x] Continue compliance with medical treatment plan (or explore barriers)     Degree to which this is a problem (1-4): 3  Degree to which goal is met (1-4)1    Date of Review: 2017       ? Depression    Goal:  Decrease average depression level from 4 to 1.   Strategies:    ?[x] Decrease social isolation     [x] Increase involvement in meaningful activities     ?[x] Discuss sleep hygiene     ?[x] Explore thoughts and expectations about self and others     ?[x] Process grief (loss of significant person, independence, role,etc.)     ?[x] Assess for suicide risk     ?[x] Implement physical activity routine (with physician  approval)     [x] Consider introduction of bibliotherapy and/or videos     [x] Continue compliance with medical treatment plan (or explore barriers)       ?  Degree to which this is a problem (1-4): 4  Degree to which goal is met (1-4): 1    Date of Review: 03/17/2017    Substance use  Goal:  Maintain Sobriety .   Strategies: ? [] Consider referral for chemical dependency evaluation     ? [x] Discuss barriers to participating in AA or other peer-facilitatedgroups         [x] Address environmental factors which may interfere with sobriety     ? [x] Explore short-term versus long-term consequences of use     ? [x] Continue compliance with medical treatment plan (or explore barriers)         ?  Degree to which this is a problem (1-4): 4  Degree to which goal is met (1-4): 2    Date of Review: 03/17/2017    Functional Impairment: 1=Not at all/Rarely  2=Some days  3=Most Days  4=Every Day   Personal: 3  Family: 3  Social: 2  Work: 2    Diagnosis:  Major depressive disorder, recurrent, mild  Generalized anxiety disorder   Opioid use disorder, in early remission on maintenance.     WHODAS 2.0 12-item version= 4.17%    Clinical assessments completed: DORENE-7, PHQ-9, Mood Questionnaire current, CAGE-AID, PANSI.      Strengths/Personal/Community Resources: Patient reported her strengths include being a good mother, self- sufficient and being independent      Barriers to Care/Areas of Growth: Patient indicated her barriers to care includes her anxiety.      Cultural Identification: (Patient indicated the following:)   Race: Bi-racial  Experience of cultural bias: Patient indicated she has experienced cultural bias including being profiled in stores due to her race and people saying she can not do a job due to being a women.   Impact of culture: Patient reported her culture impacted her by having to grow up fast due to her dad passing away.   Immigration/history of status[place] raised in [place].   Level of acculturation:  Acculturated   Time orientation: Middle and present focused  Verbal Communication Style/languages: Does best in group settings.        Persons responsible for this plan:  ? [x] Patient ? [x] Provider ? [] Other: __________________      Provider:Performed and documented by NAVIN Rendon              Patient Signature:____________________________________ Date: ______________         Therapist Signature: __________________________________ Date: ______________

## 2021-06-09 NOTE — PROGRESS NOTES
Weekly Progress Note  Linnette Alas  1990  [id number]        D) Patient had one 1:1 session at [facility] on 2/15.  Pt came for her therapy appointment as well.   Patient completed CD Day outpatient and was transferred to Service Coordination.     A) Staff facilitated groups and reviewed tx progress. Assessed for [facility]. Case was staffed with Issa CHAMBERLAIN.   R) No VAP needed at this time. Pt working on the following dimensions:  Dimension #1 - Withdrawal Potential, Risk Level 0.  Patient is back on her suboxone and a behavior contract in the mental health clinic and in CD due to missed appointments and inability to give an acceptable UA on 12/15/16. Both specimens were very diluted and absent of Buropronephrine. Dr. Hannah has put in place a very direct and specific guideline for her to follow to continue her treatment.   Pt reports she has remained abstinent from all substances of abuse since discharge from [facility] on 10/16/16/. However UA's and behavior is suspicious. At this time her UA's have been clean. She does not report any withdrawals symptoms at this time.   Dimension #2 - Biomedical - concern.-Level 1.  Patient had  2016. Pt reports she is following recommendations post childbirth and has access to medical services as needed.  Dr. Tijerina placed patient on Behavioral Contrat on 16 and noo missed appointments for clinic or treatment appointment allowed and submit clean  UA that are not diluted.  Any failure of this contract will mean immediate discharge from the clinic and treatment and referred to probation and civil commit proceedings.  The client has been following through as directed. At this time the patient is not expressing any immediate medical or dental concerns.   Dimension #3 - Emotional , Behavioral and Cognitive - concern. Level 2, Patient completed diagnostic assesmentwith [facility] on 17.  Patient came to1:1 this week at [facility] by mistake  thinking she was supposed to be there instead of home visit.  Pt updated on her last child protection hearing and said it went well.  Pt said her 4 month old will gradually be reunited with her and hoping that the case will close in May.   Dimension #4 - Treatment Resistance - concern. Level 2.  Patientt was admitted to 2700 unit directly from alf/work house for probation violation.  Patient completed CD Day outpatient program and was transferred to Service Coordination .   This week, patient came to [facility] twice-service coordination and therapy and demonstrated her determination to close the child protection.    Dimension #5 - Relapse Potential - concern. Level 2.  Patient completed CD Day Outpatient program and was transferred to Service Coordination.  Pt does not need to attend Service Coordination, but she will work with  individually on a weekly basis. Dimension #6 - Recovery Environment - concern. Level 3, serious concern. Reason Risk Rating Assigned:  Patient currently living in her [facility] apartment that she obtained through [employer]. Patient is involved with [employer], but has little sober support at home.  Moved into this apartment in January 2017.  Patient lives in a supportive family apartment where there offer in-house day care and  on staff.  Pt reevaluated her stay there and states there is a lot of benefit and support being there althought she does not like some residents issues.  Pt said her ultimate goal is to close probation and child protection, then place kids into day care while she works or go back to school and find a regular apartment when she is ready.      T) Patient completed 101 of Day Outpatient program and 3 hour of Service Coordination so far.               Psycho-Educational Curriculum  Date Attended  Psycho-Educational Curriculum  Date Attended    Acceptance    Shame/Guilt      1st Step    Anger/Rage      Affirmations    Mental Health     "  Automatic Negative Thoughts    Anxiety      Cross Addiction    Co-Occurring Disorders      Stages of Change    Hallie/Bipolar      Relapse    Trauma       Addictive Thoughts    Victim Identity      Coping Skills   2/8 Sober Structure      Relapse Prevention    Continuum of Care      Behavior Chain Analysis        Relapse Scale        Relapse Warning Sings        ABC's Smart Recovery        Medical Aspects    Non-12 Step Support      Brain/Neurotransmitters    Priorities      Medication Compliance    Spirituality      [place] Alcohol/Drug Research    Weekend Planner      Physical Health    Educational Videos      Post Acute Withdrawal    1st Step      Pregnancy and Drug Use    2nd Step      Sexual Health    Assertive Communication      Short-Term/Long-Term Effects    My name is David BLAND     Relationships    Cross Addiction      Assertive Communication    God As We Understood Him      Boundaries    HBO Relapse      Codependence     HBO What Is Addiction      Defense Mechanisms     Medical Aspects 1      Family Roles    Medical Aspects 2      Goodbye Letter    National Geographic: Stress      Intimacy    PBS Depression Out of the Shadows      Needs/Dealbreakers in Relationships    The Anonymous People     Socialization Skills    [place]      Feelings    Vinicius Roy \"Highjacked Brain\"    ABC Model of Emotion    Tommie Fischer Humor in Tx     Grief and Loss    The Mindfulness Movie     Healthy vs. Unhealthy Feelings    David BLAND documentary      Meditation/Mindfulness         Overconfidence/Complacency     Parenting and Recovery   2/15   Resentments          Stress               "

## 2021-06-09 NOTE — PROGRESS NOTES
Weekly Progress Note  Linnette Alas  1990  [id number]        D) Patient had one 1:1 session this week.  Pt came for her therapy appointment as well.   Patient completed CD Day outpatient and was transferred to Service Coordination.     A) Staff facilitated groups and reviewed tx progress. Assessed for [facility]. Case was staffed with Issa Sotelo MA [facility].   R) No VAP needed at this time. Pt working on the following dimensions:  Dimension #1 - Withdrawal Potential, Risk Level 0.  Patient is on Subxone prescribed by Dr. Huerta.  Pt is being closely monitored by clinic and coming to appointments.  Pt reported no concern for withdrawals this week.      Dimension #2 - Biomedical - concern.-Level 0.  Patient had  2016. Patient did not report medical issues this week and said her health is in good condition.   Dimension #3 - Emotional , Behavioral and Cognitive - concern. Level 2, Patient completed diagnostic assesmentwith Kait Backer on 17 and seeing the therapist weekly.    Patient reported she went to AA meetings this wee per Dr. Hannah' order.  Pt states she will have Reily in 4-5 weeks full time and get reunited.   Pt seems to be making good progress.    Dimension #4 - Treatment Resistance - concern. Level 1.  Patient has been coming to all appointments (nurse, therapy, 1:1 with ) on time and showing increased sense of responsibility.  Pt reports she took guilty to her theft charge from last year and completed FDC time.    Dimension #5 - Relapse Potential - concern. Level 2.  Patient completed CD Day Outpatient program and was transferred to Service Coordination.  Patient is seeing her mental health therapist and staying on Suboxone as ordered.     Dimension #6 - Recovery Environment - concern. Level 2, Pt reports she is slowly setting in her new apartment.  Pt reports she is meeting with parenting educator every Tuesday and complying with all court orders.  Pt reprots she tried AA  "meetings this week and working with social workers form her apartment building as well.    T) Patient completed 101 of Day Outpatient program and 5 hour of Service Coordination so far.               Psycho-Educational Curriculum  Date Attended  Psycho-Educational Curriculum  Date Attended    Acceptance    Shame/Guilt      1st Step    Anger/Rage      Affirmations    Mental Health      Automatic Negative Thoughts    Anxiety      Cross Addiction    Co-Occurring Disorders      Stages of Change    Hallie/Bipolar      Relapse    Trauma       Addictive Thoughts    Victim Identity      Coping Skills   2/8 Sober Structure      Relapse Prevention    Continuum of Care      Behavior Chain Analysis        Relapse Scale        Relapse Warning Sings        ABC's Smart Recovery        Medical Aspects    Non-12 Step Support      Brain/Neurotransmitters    Priorities      Medication Compliance    Spirituality      K[place]ON Alcohol/Drug Research    Weekend Planner      Physical Health    Educational Videos      Post Acute Withdrawal    1st Step      Pregnancy and Drug Use    2nd Step      Sexual Health    Assertive Communication      Short-Term/Long-Term Effects    My name is David BLAND     Relationships    Cross Addiction      Assertive Communication    God As We Understood Him      Boundaries    HBO Relapse      Codependence     HBO What Is Addiction      Defense Mechanisms     Medical Aspects 1      Family Roles    Medical Aspects 2      Goodbye Letter    National Geographic: Stress      Intimacy    PBS Depression Out of the Shadows      Needs/Dealbreakers in Relationships    The Anonymous People     Socialization Skills    [place]      Feelings    Vinicius Roy \"Highjacked Brain\"    ABC Model of Emotion    Tommie Fischer Humor in Tx     Grief and Loss    The Mindfulness Movie     Healthy vs. Unhealthy Feelings    David BLAND documentary      Meditation/Mindfulness         Overconfidence/Complacency     Parenting and Recovery   2/15, 2/22 "   Resentments          Stress

## 2021-06-09 NOTE — PROGRESS NOTES
D)1:1  A)reviewed master treatment plan with patient.  It was prepared on 2/8 but it had been difficult to sit down and go over it especially when her 2 year old was around.    R)Pt agreed with everything on master treatment plan, signed and received copy.  Patient updated on her criminal court.  Pt said she plead guilty and was sentenced to 7 days in snf.  Pt said she already served time over the weekend and previous time was counted.  Pt said she now has to meet with her [facility]  and admit her theft conviction.  Pt said she was already on probation for theft and not sure if this will be a violation.  Pt said she will just be honest to her PO.  Pt shred that her mother was hospitalized due to low blood pressure and could not take care of Praveena. Pt said her 70 year old aunt took care of the baby and the parenting educator brought him back home for Tuesday meeting.  Pt said Praveena will be back home full time in 4-5 weeks. Pt shared she is excited about the reunification and hoping it will make her life easier because all her baby equipment will be finally back home as well.  Pt said her boyfriend got a full time job at a [employer] and hoping to get him on the apartment release as well.   Pt said she became a good friend with a person she met while she was in outpatient program who has a 4 year old. Pt said this friend is bringing the 4 year old this afternoon for play time with Moreno.  Pt said a cable TV company is coming this afternoon as well for cable TV Access.  Pt laughed and said  Moreno has been watching basic kids TV channels between cooking shows.  Pt signed a form for [employer]  who can call her weekly to check on her sobriety.  Patient seems to be showing increased sense of responsibility

## 2021-06-10 NOTE — PROGRESS NOTES
Weekly Progress Note  Linnette Alas  1990  [id number]        D) Patient had one 1:1 session this week.  Patient completed CD Day outpatient and was transferred to Service Coordination and having only 1:1s at this time.    A) Staff facilitated groups and reviewed tx progress. Assessed for [facility]. Case was staffed with Issa Sotelo MA [facility].   R) No VAP needed at this time. Pt working on the following dimensions:  Dimension #1 - Withdrawal Potential, Risk Level 0.  Patient is on Subxone prescribed by Dr. Huerta.  Pt is being closely monitored by clinic and coming to appointments.  Pt reported no concern for withdrawals this week.      Dimension #2 - Biomedical - concern.-Level 0.  Patient had  2016. Patient did not report medical issues this week and said her health is in good condition.   Dimension #3 - Emotional , Behavioral and Cognitive - concern. Level 1, Patient regularly comes to clinic for subxone appointment and therapy.  Patient seems to be complying with court and child protection.  Patient's stress increased when she was contacted by [facility] to pay for the restitution.  Pt said due to not having reily in her her benefit decreased and she does not have any extra money to pay $80 fee. Pt expressed her concern on potentially having have to go back to court.  Pt seems taking care of her chidlren very well.  Moreno was seen speaking more words after speech therapy started.    Dimension #4 - Treatment Resistance - concern. Level 1.  Patient has been coming to all appointments (nurse, therapy, 1:1 with ) on time and showing increased sense of responsibility.    Dimension #5 - Relapse Potential - concern. Level 1.  Patient completed CD Day Outpatient program and was transferred to Service Coordination.  Patient is seeing her mental health therapist and staying on Suboxone as ordered.     Dimension #6 - Recovery Environment - concern. Level 2, Pt lives indepentently at a  "family-supportive apartment.  Pt is in the transition where she is getting more time with her 10 month old baby Praveena.  Pt is keeping her apartment very organized and clean, going to all medical/court appointments and taking care of kids well.    T) Patient completed 101 of Day Outpatient program and 5 hour of Service Coordination so far.               Psycho-Educational Curriculum  Date Attended  Psycho-Educational Curriculum  Date Attended    Acceptance    Shame/Guilt      1st Step    Anger/Rage      Affirmations    Mental Health      Automatic Negative Thoughts    Anxiety      Cross Addiction    Co-Occurring Disorders      Stages of Change    Hallie/Bipolar      Relapse    Trauma       Addictive Thoughts    Victim Identity      Coping Skills   2/8 Sober Structure      Relapse Prevention    Continuum of Care      Behavior Chain Analysis 3/15       Relapse Scale        Relapse Warning Sings        ABC's Smart Recovery        Medical Aspects    Non-12 Step Support      Brain/Neurotransmitters    Priorities   5/3,    Medication Compliance    Spirituality      KENTON Alcohol/Drug Research    Weekend Planner      Physical Health    Educational Videos      Post Acute Withdrawal    1st Step      Pregnancy and Drug Use    2nd Step      Sexual Health    Assertive Communication      Short-Term/Long-Term Effects    My name is David Ball    Cross Addiction      Assertive Communication    God As We Understood Him      Boundaries    HBO Relapse      Codependence     HBO What Is Addiction      Defense Mechanisms     Medical Aspects 1      Family Roles    Medical Aspects 2      Goodbye Letter    National Geographic: Stress      Intimacy    PBS Depression Out of the Shadows      Needs/Dealbreakers in Relationships    The Anonymous People     Socialization Skills    Max      Feelings    Vinicius Roy \"Highjacked Brain\"    ABC Model of Emotion    Tommie Fischer Humor in Tx     Grief and Loss    The Mindfulness Movie   "   Healthy vs. Unhealthy Feelings    David BLAND documentary      Meditation/Mindfulness         Overconfidence/Complacency     Parenting and Recovery   2/15, 2/22   Resentments          Stress

## 2021-06-10 NOTE — PROGRESS NOTES
Patient was seen today in follow-up for opiate dependence.  She is doing well. Has an upcoming court hearing next Thursday, hoping she will get her baby back    Last UDS performed on 17 and was: negative  Last BUP performed on  3/1/17 and was positive      Health Information [place] Date: 17  [employer] Query Report Page#: 1  Patient Rx History Report  KANDACE ALVARES  Search Criteria: Last Name 'kandace' and First Name 'anatoly' and  =  and Request Period =  to  ' - 9 out of 9 Recipients Selected.  Fill Date Product, Str, Form Qty Days Pt ID Prescriber Written RX# N/R* Pharm Pay MED+  ---------- -------------------------------- ------ ---- --------- ---------- ---------- ------------ ----- --------- ---------- ------  2017 SUBOXONE 8 MG-2 MG SL FILM 60.00 30 [id number] 2017 [id number] N [id number] 480.0  2017 TRAMADOL HCL 50 MG TABLET 20.00 5 [id number] 2017 [id number] N [id number] 20.0  2017 SUBOXONE 8 MG-2 MG SL FILM 28.00 14 [id number] 2017 [id number] N [id number] 480.0  2017 GABAPENTIN 400 MG CAPSULE 90.00 30 [id number] 2017 [id number] N [id number] 00.0  2017 SUBOXONE 8 MG-2 MG SL FILM 28.00 14 [id number] 2017 [id number] N [id number] 480.0  2017 SUBOXONE 8 MG-2 MG SL FILM 30.00 15 [id number] 2017 [id number] N [id number] 480.0  2017 SUBOXONE 8 MG-2 MG SL FILM 30.00 15 [id number] 2017 [id number] N [id number] 480.0  *N/R N=New R=Refill  +MED Daily  Prescribers for prescriptions listed  ----------------------------------------------------------------------------------------------------------------------------------  [id number] DAVID OMER MD; [facility], [address], [place] [address]  [id number] ZOEY SARABIA MD; [employer] [facility], [address] [place] [address]  [id number] JOSE DANIEL ARRIETA; DDS GENERAL DENTIST,  [address] [place] [address],[facility] [address]  Pharmacies that dispensed prescriptions listed  ----------------------------------------------------------------------------------------------------------------------------------  [id number] [facility].; : [facility] # [id number], [address] [facility] [address],  [id number] [facility].; : [facility] [id number], [address] [facility],, [facility] [address],  Patients that match search criteria  ----------------------------------------------------------------------------------------------------------------------------------  [id number] YIFAN ALVARES  89; [address] [facility] [address]  [id number] YIFAN ALVARES  89; [address] [facility] , [facility] [address]  [id number] YIFAN ALVARES  89; [id number], [facility] [address]  [id number] YIFAN JAVIER  89; [address], [place] [address]  [id number] YIFAN ALVARES  89; [address], [place] [address]  [id number] YIFAN JAVIER  89; GENERAL DELIVERY, [facility] [address]  [id number] YIFAN ALVARES,  89; [address], [place] [address]  [id number] YIFAN ALVARES,  [birth year]; [address], [place] [address]  [id number] YIFAN ALVARES,  [birth year]; [address] [place] [address]  MED Summary  This section displays cumulative MED values by unique recipient. The MED Max value is the maximum occurrence of cumulative MED  sustained for any 3 consecutive days. This value is calculated based on prescriptions dispensed during the date range requested.  -----------------------------------------------------------------------------------------------------------------------------------  [address] GI[address] SAHA; 1990; [address], [place] [address]    Correct pharmacy verified with patient and confirmed in snapshot? [x] yes []no    Medications Phoned  to Pharmacy [x] yes []no  Name of Pharmacist: Lupillo  List Medications, including dose, quantity and  instructions  Suboxone 8-2 mg film, SL, BID, # 60, no refills ( asked to void e-script if it comes thru)  Medication Prescriptions given to patient   [] yes  [x] no   List the name of the drug the prescription was written for.       Medications ordered this visit were e-scribed.  Verified by order class [x] yes  [] no  Clonidine  Medication changes or discontinuations were communicated to patient's pharmacy: [] yes  [x] no    UA collected [x] yes    [] no    [place] Prescription Monitoring Program Reviewed? [x] yes  [] no    Referrals were made to:  none  Future appointment was made: [] yes  [x] no    Dictation completed at time of chart check: [] yes  [x] no    I have checked the documentation for today s encounters and the above information has been reviewed and completed.

## 2021-06-10 NOTE — PROGRESS NOTES
"D)telephone to patient   A)This writer called patient to schedule future 1:1.  This writer could hear baby Praveena crying.  Pt said she got Eliily today \"permanently\" and that he is home for good.    R)Pt was very proud of her accomplishment and explained how all the good progress was reported to the  and she was affirmed of her good work. Pt said even the prosecuter came to her after the hearing and offered her a card and recommended that that she should enroll in a mentorship program to mentor other women in the same situation. 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    T)Pt said she will call to schedule clinic appointment and we can schedule 1:1 when she is at the clinic.    "

## 2021-06-10 NOTE — PROGRESS NOTES
Pt here for follow up.      Last DAM 17 results: negative  Last BUP 17 results: positive    Pt states that Ney is now home full time since Mother's day. Does have court again 2017 as a follow up.   Denies SI/HI thoughts at this time.    [place]  below:  Health Information [place] Date: 17  [employer] Query Report Page#: 1  Patient Rx History Report  KANDACE ALVARES  Search Criteria: Last Name 'kandace' and First Name 'anatoly' and  =  and Request Period =  to  ' - 10 out of 10 Recipients Selected.  Fill Date Product, Str, Form Qty Days Pt ID Prescriber Written RX# N/R* Pharm **MED+  ---------- -------------------------------- ------ ---- --------- ---------- ---------- ------------ ----- --------- ------  2017 SUBOXONE 8 MG-2 MG SL FILM 50.00 30 [id number] 2017 [id number] N [id number] 400.0  2017 SUBOXONE 8 MG-2 MG SL FILM 10.00 5 [id number] 2017 [id number] N [id number] 480.0  2017 GABAPENTIN 400 MG CAPSULE 90.00 30 [id number] 2017 [id number] N [id number] 00.0  2017 SUBOXONE 8 MG-2 MG SL FILM 60.00 30 [id number] 2017 [id number] N [id number] 480.0  2017 TRAMADOL HCL 50 MG TABLET 20.00 5 [id number] 2017 [id number] N [id number] 20.0  2017 SUBOXONE 8 MG-2 MG SL FILM 28.00 14 [id number] 2017 [id number] N [id number] 480.0  2017 GABAPENTIN 400 MG CAPSULE 90.00 30 [id number] 2017 [id number] N [id number] 00.0  2017 SUBOXONE 8 MG-2 MG SL FILM 28.00 14 [id number] 2017 [id number] N [id number] 480.0  2017 SUBOXONE 8 MG-2 MG SL FILM 30.00 15 [id number] 2017 [id number] N [id number] 480.0  2017 SUBOXONE 8 MG-2 MG SL FILM 30.00 15 [id number] 2017 [id number] N [id number] 480.0  2017 SUBOXONE 8 MG-2 MG SL FILM 26.00 15 [id number] 2017 [id number] N [id number] 416.0  2017 SUBOXONE 8 MG-2 MG SL  FILM 4.00 2 [id number] 01/19/2017 [id number] N [id number] 480.0  01/12/2017 SUBOXONE 8 MG-2 MG SL FILM 14.00 7 [id number] 01/12/2017 [id number] N [id number] 480.0  01/06/2017 GABAPENTIN 400 MG CAPSULE 90.00 30 [id number] 01/06/2017 [id number] N [id number] 00.0  01/04/2017 SUBOXONE 8 MG-2 MG SL FILM 16.00 8 [id number] 01/04/2017 [id number] N [id number] 480.0  12/28/2016 SUBOXONE 8 MG-2 MG SL FILM 14.00 7 [id number] 12/27/2016 [id number] N [id number] 480.0  12/22/2016 SUBOXONE 8 MG-2 MG SL FILM 14.00 7 [id number] 12/22/2016 [id number] N [id number] 480.0  12/19/2016 LORAZEPAM 0.5 MG TABLET 9.00 3 [id number] 12/19/2016 [id number] N [id number] 00.0  12/19/2016 GABAPENTIN 400 MG CAPSULE 21.00 7 [id number] 12/15/2016 [id number] N [id number] 00.0  12/06/2016 BUPRENORPHINE 8 MG TABLET SL 4.00 2 [id number] 12/05/2016 [id number] N [id number] 480.0  *N/R N=New R=Refill  +MED Daily  Prescribers for prescriptions listed  ----------------------------------------------------------------------------------------------------------------------------------  [id number] KIRAN RAGSDALE K, MD; [address], [place] [address]  [id number] DABLILY-MIKALAQUITA; [address] [facility] [address]  [id number] HERMINIA DALY MD,FACEP; EMERGENCY DEPARTMENT, [facility], [address] [facility] [address]  [id number] DAVID OMER MD; [facility], [address], [place] [address]  [id number] ZOEY SARABIA MD; [employer] [facility], [address] [place] [address]  [id number] JOSE DANIEL ARRIETA; [facility] GENERAL DENTIST, [address] [place] [address]  Pharmacies that dispensed prescriptions listed  ----------------------------------------------------------------------------------------------------------------------------------  [id number] [employer].; : [facility] # [id number], [address] [facility] [place] [address],  [id number] [employer].; :  [facility] # [id number], [address] [facility] [address],  [id number] [facility].; : [facility] # [id number], [address] [facility],, [facility] [address],  [facility] Date: 17  [employer] Query Report Page#: 2  Patient Rx History Report  YIFAN VELAZQUEZA  Patients that match search criteria  ----------------------------------------------------------------------------------------------------------------------------------  [id number] YIFAN ALVARES,  89; [address] [facility], [facility] [place] [address]  [id number] YIFAN VELAZQUEZA,  89; [address] [facility] , [facility] [place] [address]  [id number] YIFAN ALVARES,  [birth year]; [id number], [facility] [place] [address]  [id number] YIFAN ALVARES M,  89; [address], [place] [address]  [id number] YIFAN ALVARES,  89; [address], [place] [address]  [id number] YIFAN ALVARES M,  [birth year]; GENERAL DELIVERY, [facility] [address]  [id number] YIFAN VELAZQUEZA,  89; [address], [place] [address]  [id number] YIFAN ALVARES,  89; [address], [place] [address]  [id number] YIFAN ALVARES,  89; [address], [place] [address]  [id number] YIFAN ALVARES,  [birth year]; [address], [place] [address]  MED Summary  This section displays cumulative MED values by unique recipient. The MED Max value is the maximum occurrence of cumulative MED  sustained for any 3 consecutive days. This value is calculated based on prescriptions dispensed during the date range requested.  -----------------------------------------------------------------------------------------------------------------------------------  [address] SA[address]; 1990; [address] [place] [address]  [address] SAHA; 1990; [address] [place] [address]  [address] SAHA; 1990; [address], [place] [address]

## 2021-06-10 NOTE — PROGRESS NOTES
Weekly Progress Note  Linnette Alas  1990  [id number]        D) Patient had one 1:1 session this week.  Pt came for her therapy appointment as well.   Patient completed CD Day outpatient and was transferred to Service Coordination.     A) Staff facilitated groups and reviewed tx progress. Assessed for VA. Case was staffed with Issa Sotelo MA [facility].   R) No VAP needed at this time. Pt working on the following dimensions:  Dimension #1 - Withdrawal Potential, Risk Level 0.  Patient is on Subxone prescribed by Dr. Huerta.  Pt is being closely monitored by clinic and coming to appointments.  Pt reported no concern for withdrawals this week.      Dimension #2 - Biomedical - concern.-Level 0.  Patient had  2016. Patient did not report medical issues this week and said her health is in good condition.   Dimension #3 - Emotional , Behavioral and Cognitive - concern. Level 1, Patient regularly comes to clinic for subxone appointment and therapy.  Patient seems to be doing very well.  [facility] this writer and informed she is complying with their plan and doing well as well.  Patient continues to dedicate her time to her sons. Reunification is slowing progressin months old baby comes back to her on  and 8 hours on weekends.    Dimension #4 - Treatment Resistance - concern. Level 1.  Patient has been coming to all appointments (nurse, therapy, 1:1 with ) on time and showing increased sense of responsibility.    Dimension #5 - Relapse Potential - concern. Level 1.  Patient completed CD Day Outpatient program and was transferred to Service Coordination.  Patient is seeing her mental health therapist and staying on Suboxone as ordered.     Dimension #6 - Recovery Environment - concern. Level 2, Pt sends her 2 year old to in-house day care.  Moreno seems her gained more vocabularies and he knows where his day care is located.  Pt seems following the  "parenting educator's suggestions on parenting and was giving him an appropriate time-out when he threw a fit.  Pt states the father of the children are very supportive and joins her on weekends.      T) Patient completed 101 of Day Outpatient program and 5 hour of Service Coordination so far.               Psycho-Educational Curriculum  Date Attended  Psycho-Educational Curriculum  Date Attended    Acceptance    Shame/Guilt      1st Step    Anger/Rage      Affirmations    Mental Health      Automatic Negative Thoughts    Anxiety      Cross Addiction    Co-Occurring Disorders      Stages of Change    Hallie/Bipolar      Relapse    Trauma       Addictive Thoughts    Victim Identity      Coping Skills   2/8 Sober Structure      Relapse Prevention    Continuum of Care      Behavior Chain Analysis 3/15       Relapse Scale        Relapse Warning Sings        ABC's Smart Recovery        Medical Aspects    Non-12 Step Support      Brain/Neurotransmitters    Priorities      Medication Compliance    Spirituality      KENT[place] Alcohol/Drug Research    Weekend Planner      Physical Health    Educational Videos      Post Acute Withdrawal    1st Step      Pregnancy and Drug Use    2nd Step      Sexual Health    Assertive Communication      Short-Term/Long-Term Effects    My name is David BLAND     Relationships    Cross Addiction      Assertive Communication    God As We Understood Him      Boundaries    HBO Relapse      Codependence     HBO What Is Addiction      Defense Mechanisms     Medical Aspects 1      Family Roles    Medical Aspects 2      Goodbye Letter    National Geographic: Stress      Intimacy    PBS Depression Out of the Shadows      Needs/Dealbreakers in Relationships    The Anonymous People     Socialization Skills    Almo      Feelings    Vinicius Roy \"Highjacked Brain\"    ABC Model of Emotion    Tommie Fischer Humor in Tx     Grief and Loss    The Mindfulness Movie     Healthy vs. Unhealthy Feelings    David BLAND " documentary      Meditation/Mindfulness         Overconfidence/Complacency     Parenting and Recovery   2/15, 2/22   Resentments          Stress

## 2021-06-10 NOTE — PROGRESS NOTES
"Addiction  Nurse Only Visit Note    4/13/2017  Date of last visit: 3/31/17    Reason for today's visit:   Chief Complaint   Patient presents with     Follow-up       Vitals:    04/13/17 1229   BP: 155/90   Patient Site: Right Arm   Patient Position: Sitting   Cuff Size: Adult Regular   Pulse: 74   Temp: 98.4  F (36.9  C)   TempSrc: Oral   Weight: (!) 258 lb (117 kg)   Height: 5' 7\" (1.702 m)        If having pain, who will address pain:  No pain    Is pt assisted: No    Urine for toxicology sent today: yes    Last UA date: 3/31/17  Reviewed with patient: yes  Results: DAM neg    AIMS:     Pill count: NA  (Controlled substance only)  Medications needing renewal: see note    Substance use history:    Using alcohol:No  Using street drugs or unprescribed medications: No  Using opioids other that Suboxone:No  Using tobacco:Yes: Describe: 1/2 pack/day    Recovery environment:  Attending formal chemical dependency programming: Yes: Describe: Service Coordination with [employer]  Attending \"outside\" mutual help meetings: Yes: Describe: 2 meetings/week  Has a chemical dependency sponsor: No  Has other elements of structure: Yes: Describe: seeing Keira every other week  Current Living Situation: own place    Cravings: no, had using dream    Sleep: yes still have a hard a time, using night time meds more carefully    Depression: no    Anxiety: yes 5/10    Panic Attacks: no    Paranoia: no      Hallucinations: no      Suicidal Ideation: no    Homicidal Ideation:no  Comments: none    Physical Problems: yes has toothache, needs to make appt    [place]  was reviewed prior to seeing patient today. See below for embedded report.    Note: No issues or concerns today. Reports that sleep has always been an issue but she is working on not using all her prn sleep med and not relaying on meds for everything.  Is able to have unsupervised visit with her baby twice a week now.  Attending meetings twice weekly. Missed last meeting with Keira " due to son being sick. Doing parenting education and speech therapy with her son. Does not need Suboxone today, has enough until next visit. Request refills for Clonidine and Gabapentin (pended to Dr. Hannah).    Medication(s) being filled Clonidine 0.1 mg  Date of Next Office Visit: 17  Date medication last ordered: 3/16/17  Qty: 40  Refills: 0  Lapse in therapy greater than 7 days: no  Medication refill request verified as identical to current order: yes     [x] Medication refilled per [facility] M-1.       Patient Instructions   Continue Medications as ordered.  No alcohol or unprescribed drug use.  No driving, if sedated.  Come to the Emergency Room if not feeling safe.  Call the clinic with any questions [phone].  Follow up as directed, for your appointments, per your After Visit Summary Form.      Call the clinic if any concerns: [facility] [phone]  [facility] [phone]  and Report to the emergency room if you feel like harming yourself or others or are psychotic        Lois Cervantes    2017 12:32 PM    KANDACE ALVARES  Search Criteria: Last Name 'kandace' and First Name 'anatoly' and  = '[birth year]' and Request Period = '17' to  ' - 9 out of 9 Recipients Selected.  Fill Date Product, Str, Form Qty Days Pt ID Prescriber Written RX# N/R* Pharm Pay MED+  ---------- -------------------------------- ------ ---- --------- ---------- ---------- ------------ ----- --------- ---------- ------  2017 SUBOXONE 8 MG-2 MG SL FILM 60.00 30 [id number] 2017 [id number] N [id number] 480.0  2017 TRAMADOL HCL 50 MG TABLET 20.00 5 [id number] 2017 [id number] N [id number] 20.0  2017 SUBOXONE 8 MG-2 MG SL FILM 28.00 14 [id number] 2017 [id number] N [id number] 480.0  2017 GABAPENTIN 400 MG CAPSULE 90.00 30 [id number] 2017 [id number] N [id number] 00.0  2017 SUBOXONE 8 MG-2 MG SL FILM 28.00 14 [id number] 2017 [id number]  N [id number] 480.0  2017 SUBOXONE 8 MG-2 MG SL FILM 30.00 15 [id number] 2017 [id number] N [id number] 480.0  2017 SUBOXONE 8 MG-2 MG SL FILM 30.00 15 [id number] 2017 [id number] N [id number] 480.0  2017 SUBOXONE 8 MG-2 MG SL FILM 26.00 15 [id number] 2017 [id number] N [id number] 416.0  2017 SUBOXONE 8 MG-2 MG SL FILM 4.00 2 [id number] 2017 [id number] N [id number] 480.0  2017 SUBOXONE 8 MG-2 MG SL FILM 14.00 7 [id number] 2017 [id number] N [id number] 480.0  *N/R N=New R=Refill  +MED Daily  Prescribers for prescriptions listed  ----------------------------------------------------------------------------------------------------------------------------------  [id number] DAVID OMER MD; [facility], [address], [place] [address]  [id number] ZOEY SARABIA MD; [facility] [facility], [address] [place] [address]  [id number] JOSE DANIEL ARRIETA; [facility], [address] [place] [address]  Pharmacies that dispensed prescriptions listed  ----------------------------------------------------------------------------------------------------------------------------------  [id number] [facility] : [id number], [address],[facility] [address],  [id number] [facility] : [id number], [address],, [facility] [address],  [id number] [facility] : [facility] [id number], [address] [place],, [facility] [address],  Patients that match search criteria  ----------------------------------------------------------------------------------------------------------------------------------  [id number] YIFAN ALVARES,  89; [address] [facility], [facility] [address]  [id number] YIFAN ALVARES,  89;  [facility] , [facility] [address]  [id number] YIFAN ALVARES  89; [id number], [facility] [address]  [id number] YIFAN JAVIER,  89; [address],  [place] [address]  [id number] YIFAN ALVARES,  89; [address], [place] [address]  [id number] YIFAN ALVARES M,  89; GENERAL DELIVERY, [facility] [address]  [id number] YIFAN ALVARES,  89; [address], [place] [address]  [id number] YIFAN ALVARES,  89; [address] [place] [address]  [id number] YIFAN ALVARES,  89; [address], [place] [address]
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
## 2021-06-10 NOTE — PROGRESS NOTES
MENTAL HEALTH VISIT NOTE    04/27/2017  Start time: 100    Stop Time: 200   Session # 2    Linnette Alas is a 26 y.o. female is being seen today for    Chief Complaint   Patient presents with      Follow Up     New symptoms or complaints: None    Functional Impairment:   Personal: 3  Family: 4  Work: 1  Social:1    Clinical assessment of mental status: Linnette Alas presented on time.  She was open and cooperative, and dressed appropriately for this session and weather. Her memory was intact.  Her  speech was intact.  Language was intact.  Concentration and focus is within normal limits. Psychosis is not noted or reported. She reports her mood is anxious.  Affect is congruent with speech.  Fund of knowledge is adequate. Insight is adequate for therapy.     Suicidal/Homicidal Ideation present: None Reported This Session    Patient's impression of their current status: Patient reported feeling anxious at times. Patient indicated that she has her youngest son back for long weekends which has been great. Patient talked about the challenges that occur and how she overcomes these challenges. Patient indicated there being some struggles with her relationship at this time. Patient reported she is working on putting her children first and not letting relationships cause her significant stress.     Therapist impression of patients current state: Patient appears to have fair insight into her mental health. This therapist challenged patient on ways she struggles asking for help. This therapist processed with patient the importance of asking for help during therapy so that she can present without her children. This therapist processed with patient relationships dynamics.     Type of psychotherapeutic technique provided: Insight oriented, Client centered and CBT    Progress toward short term goals: Progress as expected with medication management and returning to scheduled session.     Review of long term goals: Long-term  goals reviewed  03/08/2017    Diagnosis:   1. Major depressive disorder, recurrent episode, mild    2. DORENE (generalized anxiety disorder)    3. Opioid use disorder, severe, dependence      Plan and Follow up: Patient will return in two weeks for scheduled session. Patient will continue to benefit from one on one therapy. Patient will benefit from using coping skills taught in session to reduce mental health symptoms. Patient will benefit asking for help specifically with her children. This therapist went over the importance of self-care.     Discharge Criteria/Planning: Patient will continue with follow-up until therapy can be discontinued without return of signs and symptoms.    Kait Kenny

## 2021-06-10 NOTE — PROGRESS NOTES
Correct pharmacy verified with patient and confirmed in snapshot? [x] yes []no    Medications Phoned  to Pharmacy [] yes [x]no  Name of Pharmacist:  List Medications, including dose, quantity and instructions      Medication Prescriptions given to patient   [] yes  [x] no   List the name of the drug the prescription was written for.       Medications ordered this visit were e-scribed.  Verified by order class [x] yes  [] no   Clonidine, Suboxone  Medication changes or discontinuations were communicated to patient's pharmacy: [] yes  [x] no    UA collected [x] yes    [] no    [place] Prescription Monitoring Program Reviewed? [x] yes  [] no    Referrals were made to:  NONE    Future appointment was made: [x] yes  [] no    Dictation completed at time of chart check: [x] yes  [] no    I have checked the documentation for today s encounters and the above information has been reviewed and completed.
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
## 2021-06-12 NOTE — PROGRESS NOTES
Correct pharmacy verified with patient and confirmed in snapshot? [x] yes []no    Medications Phoned  to Pharmacy [] yes [x]no  Name of Pharmacist:  List Medications, including dose, quantity and instructions      Medication Prescriptions given to patient   [] yes  [x] no   List the name of the drug the prescription was written for.      Medications ordered this visit were e-scribed.  Verified by order class [x] yes  [] no  Suboxone, Gabapentin, Clonidine    Medication changes or discontinuations were communicated to patient's pharmacy:  [x] yes  [] no  Pharmacist Spoke With: Jag regarding dose change of Gababpentin    UA collected [x] yes  [] no    [place] Prescription Monitoring Program Reviewed? [x] yes  [] no    Referrals/Labs were made to:     Completed Charge Capture?  [x] yes  [] no    Future appointment was made: [x] yes  [] no  8/31/17  Dictation completed at time of chart check: [x] yes  [] no    I have checked the documentation for today s encounters and the above information has been reviewed and completed.

## 2021-06-12 NOTE — PROGRESS NOTES
Addiction Medicine  Outpatient Visit  Dr. Hannah    2017    Linnette Alas, 1990.    Subjective   Patient was seen today in follow-up for opiate use disorder, severe.    Last UDS performed on 17 and was: negative  Last BUP performed on 17 and was: positive     UA collected    Pt states things are going good, pt got custody of her baby again.      KANDACE HAIRINDA  Search Criteria: Last Name 'kandace' and First Name 'linnette' and  =  and Request Period =  to  ' - 10 out of 10 Recipients Selected.  Fill Date Product, Str, Form Qty Days Pt ID Prescriber Written RX# N/R* Pharm **MED+  ---------- -------------------------------- ------ ---- --------- ---------- ---------- ------------ ----- --------- ------  2017 SUBOXONE 8 MG-2 MG SL FILM 60.00 30 [id number] 2017 [id number] N [id number] 480.0  2017 GABAPENTIN 400 MG CAPSULE 90.00 30 [id number] 2017 [id number] N [id number] 00.0  2017 SUBOXONE 8 MG-2 MG SL FILM 60.00 30 [id number] 2017 [id number] N [id number] 480.0  *N/R N=New R=Refill  +MED Daily  Prescribers for prescriptions listed  ----------------------------------------------------------------------------------------------------------------------------------  [id number] DAVID HANNAH MD; [facility], [address], [place] [address]  Pharmacies that dispensed prescriptions listed  ----------------------------------------------------------------------------------------------------------------------------------  [id number] [employer]; : [facility] # [id number], [address] [facility] [address],  Patients that match search criteria  ----------------------------------------------------------------------------------------------------------------------------------  [id number] KANDACE ALVARES  88; [address] [facility] [address]  [id number] KANDACE ALVARES  [birth year];  [facility]  , [facility] [address]  [id number] YIFAN ALVARES,  [birth year]; [id number], [facility] [address]  [id number] YIFAN JAVIER,  [birth year]; [address], [place] [address]  [id number] YIFAN ALVARES,  [birth year]; [address], [place] [address]  [id number] YIFAN ALVARES M,  88; GENERAL DELIVERY, [facility] [address]  [id number] YIFAN ALVARES,  [birth year]; [address], [place] [address]  [id number] YIFAN ALVARES,  [birth year]; [address], [place] [address]  [id number] YIFAN ALVARES,  [birth year]; [address], [place] [address]  [id number] YIFAN ALVAERS,  [birth year]; [address], [place] [address]  MED Summary  This section displays cumulative MED values by unique recipient. The MED Max value is the maximum occurrence of cumulative MED  sustained for any 3 consecutive days. This value is calculated based on prescriptions dispensed during the date range requested.  -----------------------------------------------------------------------------------------------------------------------------------  480 GIORGIO SAHA; 1990; [address], [place] [address]
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
## 2021-06-12 NOTE — PROGRESS NOTES
MENTAL HEALTH VISIT NOTE    08/31/2017  Start time: 1000   Stop Time: 1055   Session # 4    Linnette Alas is a 27 y.o. female is being seen today for    Chief Complaint   Patient presents with      Follow Up     New symptoms or complaints: None    Functional Impairment:   Personal: 3  Family: 4  Work: 1  Social:1    Clinical assessment of mental status: Linnette Alas presented on time for her scheduled session.  She was open and cooperative, and dressed appropriately for this session and weather. Her memory was intact.  Her  speech was intact.  Language was intact.  Concentration and focus is within normal limits. Psychosis is not noted or reported. She reports her mood is anxious.  Affect is congruent with speech.  Fund of knowledge is adequate. Insight is adequate for therapy.     Suicidal/Homicidal Ideation present: None Reported This Session    Patient's impression of their current status: Patient reported feeling anxious. Patient indicated her doctor being sick and being worried that she will not get her medications. Patient reported her CPS case being closed and being excited about having bother her children home. Patient talked about the struggles she encounters due to being a single mom. Patient indicated now that their father is not in the picture everything depends on her. Patient talked about financially how it is affected her and working to make ends meet.     Therapist impression of patients current state: Patient appears to have fair insight into her mental health. This therapist challenged patient on ways she struggles to ask for help at times. This therapist processed with patient coping skills to help in the moment when she is feeling overwhelmed.     Type of psychotherapeutic technique provided: Insight oriented, Client centered and CBT    Progress toward short term goals: Progress as expected with medication management and returning to scheduled session.     Review of long term goals:  Long-term goals reviewed  03/08/2017    Diagnosis:   1. Major depressive disorder, recurrent episode, mild    2. DORENE (generalized anxiety disorder)      Plan and Follow up: Patient will return in two weeks for scheduled session. Patient will continue to benefit from one on one therapy. Patient will benefit from using coping skills taught in session to reduce mental health symptoms. Patient will benefit asking for help specifically with her children. This therapist went over the importance of self-care. Patient would benefit from continuing to monitor her symptoms from the trauma. Patient should continue to confront the symptoms with this therapist.     Discharge Criteria/Planning: Patient will continue with follow-up until therapy can be discontinued without return of signs and symptoms.    Kait Kenny

## 2021-06-13 NOTE — PROGRESS NOTES
Patient was seen today in follow-up for opiate dependence.  She is recovering from the head trauma. Her ex-boyfriend is now in [facility], pt is terrified of retaliation. Says she is extremely anxious, scared to leave her house. Client said that her current dose of Suboxone does not hold her cravings, especially now.    Last UDS performed on 17 and was: positive for opiates and THC  Last BUP performed on  17 was negative      See attached  report below      [facility] Date: 10/11/17  Query Report Page#: 1  Patient Rx History Report  KANDACE ALVARES  Search Criteria: Last Name 'kandace' and First Name 'anatoly' and  =  and Request Period = ' to  '10/11/17' - 10 out of 10 Recipients Selected.  Fill Date Product, Str, Form Qty Days Pt ID Prescriber Written RX# N/R* Pharm **MED+  ---------- -------------------------------- ------ ---- --------- ---------- ---------- ------------ ----- --------- ------  10/04/2017 SUBOXONE 8 MG-2 MG SL FILM 16.00 8 [id number] 2017 [id number] N [id number] 480.0  2017 SUBOXONE 8 MG-2 MG SL FILM 16.00 8 [id number] 2017 [id number] N [id number] 480.0  2017 SUBOXONE 8 MG-2 MG SL FILM 28.00 14 [id number] 2017 [id number] N [id number] 480.0  2017 SUBOXONE 8 MG-2 MG SL FILM 60.00 30 [id number] 2017 [id number] N [id number] 480.0  2017 GABAPENTIN 400 MG CAPSULE 110.00 25 [id number] 2017 [id number] N [id number] 00.0  *N/R N=New R=Refill  +MED Daily  Prescribers for prescriptions listed  ----------------------------------------------------------------------------------------------------------------------------------  [id number] MATHEW MONTIEL MD; [address], [place] [address]  [id number] DAVID OMER MD; [facility], [address], [place] [address]  Pharmacies that dispensed prescriptions  listed  ----------------------------------------------------------------------------------------------------------------------------------  [id number] [facility].; : [id number], [address] [facility] [address],  [id number] Moku.; : [facility] # [id number], [address] [facility],, [facility] [address],  [id number] [facility] ; [address] [facility] [address], [facility] [address],  Patients that match search criteria  ----------------------------------------------------------------------------------------------------------------------------------  [id number] YIFAN ALVARES,  89; [address] [facility], [facility] [address]  [id number] YIFAN ALVARES,  89; [address] [facility] , [facility] [address]  [id number] YIFAN ALVARES,  89; [id number], [facility] [address]  [id number] YIFAN JAVIER,  89; [address], [place] [address]  [id number] YIFAN ALVARES  89; [address], [place] [address]  [id number] YIFAN ALVARES M,  [birth year]; GENERAL DELIVERY, [facility] [address]  [id number] YIFAN ALVARES,  89; [address], [place] [address]  [id number] YIFAN ALVARES,  89; [address] [place] [address]  [id number] YIFAN ALVARES,  89; [address], [place] [address]  [id number] YIFAN ALVARES,  89; [address], [place] [address]  MED Summary  This section displays cumulative MED values by unique recipient. The MED Max value is the maximum occurrence of cumulative MED  sustained for any 3 consecutive days. This value is calculated based on prescriptions dispensed during the date range requested.  -----------------------------------------------------------------------------------------------------------------------------------  480 GIORGIO SAHA; 1990; [address], [place] [address]  **Per CDC guidance, the conversion factors and associated daily morphine milligram equivalents for drugs  prescribed as part of  medication-assisted treatment for opioid use disorder should not be used to benchmark against dosage thresholds meant for opioids  prescribed for pain.    Correct pharmacy verified with patient and confirmed in snapshot? [x] yes []no    Charge captured ? [x] yes  [] no    Medications Phoned  to Pharmacy [] yes [x]no  Name of Pharmacist:  List Medications, including dose, quantity and instructions      Medication Prescriptions given to patient   [] yes  [x] no   List the name of the drug the prescription was written for.       Medications ordered this visit were e-scribed.  Verified by order class [x] yes  [] no  Suboxone, Trazodone, and Gabapentin  Clonidine order pended for provider   Medication changes or discontinuations were communicated to patient's pharmacy: [] yes  [x] no    UA collected [x] yes  [] no    [place] Prescription Monitoring Program Reviewed? [x] yes  [] no    Referrals were made to:  none    Future appointment was made: [x] yes  [] no    Dictation completed at time of chart check: [] yes  [x] no    I have checked the documentation for today s encounters and the above information has been reviewed and completed.

## 2021-06-13 NOTE — PROGRESS NOTES
"Addiction  Nurse Only Visit Note    10/25/2017  Date of last visit: 10/19/2017    Reason for today's visit: follow-up  Chief Complaint   Patient presents with     Follow-up       Vitals:    10/25/17 1044   BP: (!) 169/97   Patient Site: Right Arm   Patient Position: Sitting   Cuff Size: Adult Regular   Pulse: 88   Resp: 18   Temp: 98.7  F (37.1  C)   TempSrc: Oral   Weight: (!) 278 lb (126.1 kg)   Height: 5' 7\" (1.702 m)        If having pain, who will address pain:  4 headache and shoulder pain, will give resources for primary MD.    Is pt assisted: No    Urine for toxicology sent today: yes    Last UA date: 10/19/2017  Reviewed with patient: yes  Results: negative    AIMS: NA    Pill count: No  (Controlled substance only)  Medications needing renewal: Buprenorphine-Naloxone and Clonidine    Substance use history:    Using alcohol:No  Using street drugs or unprescribed medications: No  Using opioids other that Suboxone:No  Using tobacco:Yes: Describe: 0.25 pack per day    Recovery environment:  Attending formal chemical dependency programming: No, but may go back  Attending \"outside\" mutual help meetings: Yes: Describe: 8 meetings last week.  Has a chemical dependency sponsor: Yes: Describe: temporary and talk to at least weekly  Has other elements of structure: Yes: Describe: Kait, here, and sees her biweekly to monthly  Current Living Situation: with kids    Cravings: yes minimal    Sleep: yes trouble staying asleep.      Depression: yes moderate    Anxiety: yes moderate to high.    Panic Attacks: yes one when taking kids to the store.    Paranoia: yes a little      Hallucinations: no      Suicidal Ideation: no    Homicidal Ideation:no  Comments: NA    Physical Problems: yes upper back pain, will give information on posssibe primary MD'sLaureano Anglin discussed today that Dr. Hannah instructed her to start taking the Buprenorphine-Naloxone 8-2 mg one strip am and hs and 1/2 strip in afternoon, at the last " visit.  She said the LPN working with Dr. Hannah said to not  the script on that day, but to give him until the next day to correct that, so she did not look at the bottle when she picked it up, but it did not have the change on it.  Cannot see the change in the order or anything about changing it in Dr. Hannah note.  She also said that her Clonidine script is written differently, and she had been taking it TID with her Gabapentin, and this has worked very well, but now it says she could take it every 8 hours, but then also states only twice daily and only #60 ordered, so she would appreciate Dr. Hannah also changing that back, as she will be running out early.  There appears to be a new script that she has not picked up yet, but that has two different directives on, so will have him address what is to be correct, tomorrow. Told her these concerns would be addressed with him tomorrow, when he is in the clinic.  She was also given the names of primary MD's so she could be seen about her upper back pain which is due to someone stepping on her back.  She also wanted Dr. Hannah to know that she went to 8 AA meetings last week, and has a temporary sponsor, she said he will be so proud of her, and was very excited for him to have that information.  Today's DAM was negative.    Education Done Today  Patient Instructions:   Continue Medications as ordered.  No alcohol or unprescribed drug use.  No driving, if sedated.  Come to the Emergency Room if not feeling safe.  Call the clinic with any questions [phone].  If you identify that you are pregnant, please call us to inform us, as medications may need to be changed.  You can also contact Urgent Care Crisis Line at [phone] 24 hours a day for help.  Follow up as directed, for your appointments, per your After Visit Summary Form.    [facility] [place] Date: 10/25/17  Query Report Page#: 1  Patient Rx History Report  YIFAN Tan  Criteria: Last Name 'kandace' and First Name 'anatoly' and  = ' and Request Period = ' to  '10/25/17' - 10 out of 10 Recipients Selected.  Fill Date Product, Str, Form Qty Days Pt ID Prescriber Written RX# N/R* Pharm **MED+  ---------- -------------------------------- ------ ---- --------- ---------- ---------- ------------ ----- --------- ------  10/14/2017 SUBOXONE 8 MG-2 MG SL FILM 28.00 14 [id number] 10/11/2017 [id number] N [id number] 480.0  10/11/2017 GABAPENTIN 400 MG CAPSULE 110.00 27 [id number] 10/11/2017 [id number] N [id number] 00.0  10/04/2017 SUBOXONE 8 MG-2 MG SL FILM 16.00 8 [id number] 2017 [id number] N [id number] 480.0  2017 SUBOXONE 8 MG-2 MG SL FILM 16.00 8 [id number] 2017 [id number] N [id number] 480.0  2017 SUBOXONE 8 MG-2 MG SL FILM 28.00 14 [id number] 2017 [id number] N [id number] 480.0  2017 SUBOXONE 8 MG-2 MG SL FILM 60.00 30 [id number] 2017 [id number] N [id number] 480.0  2017 GABAPENTIN 400 MG CAPSULE 110.00 25 [id number] 2017 [id number] N [id number] 00.0  *N/R N=New R=Refill  +MED Daily  Prescribers for prescriptions listed  ----------------------------------------------------------------------------------------------------------------------------------  [id number] MATHEW MONTIEL MD; [address] [place], [place] [id number], [place] [address]  [id number] DAVID OMER MD; [facility], [address], [place] [address]  Pharmacies that dispensed prescriptions listed  ----------------------------------------------------------------------------------------------------------------------------------  [id number] [facility].; : [facility] # [id number], [address] [facility] [address],  [id number] [facility].; : [facility] [id number], [address],, [facility] [place] [address],  [id number] [facility] ; [address] [facility] [address], [facility] [address],  Patients that match search  criteria  ----------------------------------------------------------------------------------------------------------------------------------  [id number] YIFAN GIORGIO,  89; [address] [facility], [facility] [address]  [id number] YIFAN GIORGIO,  89; [address] [facility] , [facility] [address]  [id number] SAHA GIORGIO,  89; [id number], [facility] [address]  [id number] YIFAN GIORGIO M,  89; [address], [place] [address]  [id number] YIFAN GIORGIO,  89; [address], [place] [address]  [id number] YIFAN GIORGIO M,  88; GENERAL DELIVERY, [facility] [address]  [id number] SAHA GIORGIO,  89; [address], [place] [address]  [id number] YIFAN GIORGIO,  89; [address], [place] [address]  [id number] YIFAN VELAZQUEZA,  89; [address], [place] [address]  [id number] YIFAN GIORGIO,  89; [address], [place] [address]  480 MARCUS SAHAA; 1990; [address], [place] [address]  480 YIFAN GIORGIO; 1990; [address], [place] [address]  MED Summary  This section displays cumulative MED values by unique recipient. The MED Max value is the maximum occurrence of cumulative MED  sustained for any 3 consecutive days. This value is calculated based on prescriptions dispensed during the date range requested.  **Per CDC guidance, the conversion factors and associated daily morphine milligram equivalents for drugs prescribed as part of  medication-assisted treatment for opioid use disorder should not be used to benchmark against dosage thresholds meant for opioids  prescribed for pain.  Report Disclaimers:  The report provided above is based upon the search criteria and the data provided by the dispensing entities. For more information  about any prescription, please contact the dispenser or the prescriber.  This report contains confidential information, including patient identifiers,  and is not a public record. The information on this  report must be treated as protected health information and is to be disclosed to others only as authorized by applicable state  and Federal regulations.  Rizwana Morejon    10/25/2017 10:53 AM

## 2021-06-13 NOTE — PROGRESS NOTES
Dr. Hannah made adjustments in the Buprenorphine-Naloxone to 20 mg daily.  Pharmacy states that is triggering a PA.  He also only wants the Clonidine to be 0.1 mg One tablet every 8 to 12 hours as needed for severe anxiety. Max dose 2 tabs per day, #60 and 0 refills.  This order was sent to the pharmacy per Dr. Hannah' directive.  Pharmacist said she last received Clonidine on 10/12/2017 for #60.  The old order saying #90 was discontinued with the pharmacist.  Linnette was called and explained to about both medications.  She was told Dr. Hannah does not want her on more that two Clonidine 0.1 mg daily, as more is very dangerous, this is a very potent blood pressure medication, and he cares about her safety.  She was fine with that.   She said she will call or stop and see Sarah today, about returning to he CD group, said she has mindfulness group today with David.  She said she has spoken to her Mom, and Mom can watch the children probably 3 days per week, but not four, so she will talk to Sarah on this.  Dr. Hannah feels she needs more services.

## 2021-06-13 NOTE — PROGRESS NOTES
Patient was seen today in follow-up for opiate dependence.    Last UDS performed on 10/25/17 and was: negative  Last BUP performed on 10/11/17 and was positive    Pt is here for routine follow up. She is doing well, has no concerns today. Appetite has been very minimal, pt had lost 8 lbs since last visit.     See attached  report below      [facility] [place] Date: 17  Query Report Page#: 1  Patient Rx History Report  YIFAN ALVARES  Search Criteria: Last Name 'Yifan' and First Name 'Linnette' and  =  and Request Period = ' to  ' - 10 out of 10 Recipients Selected.  Fill Date Product, Str, Form Qty Days Pt ID Prescriber Written RX# N/R* Pharm **MED+  ---------- -------------------------------- ------ ---- --------- ---------- ---------- ------------ ----- --------- ------  10/27/2017 SUBOXONE 8 MG-2 MG SL FILM 17.00 12 [id number] 10/27/2017 [id number] N [id number] 340.0  10/14/2017 SUBOXONE 8 MG-2 MG SL FILM 28.00 14 [id number] 10/11/2017 [id number] N [id number] 480.0  10/11/2017 GABAPENTIN 400 MG CAPSULE 110.00 27 [id number] 10/11/2017 [id number] N [id number] 00.0  10/04/2017 SUBOXONE 8 MG-2 MG SL FILM 16.00 8 [id number] 2017 [id number] N [id number] 480.0  2017 SUBOXONE 8 MG-2 MG SL FILM 16.00 8 [id number] 2017 [id number] N [id number] 480.0  2017 SUBOXONE 8 MG-2 MG SL FILM 28.00 14 [id number] 2017 [id number] N [id number] 480.0  *N/R N=New R=Refill  +MED Daily  Prescribers for prescriptions listed  ----------------------------------------------------------------------------------------------------------------------------------  [id number] MATHEW MONTIEL MD; [address], [place] [address]  [id number] DAVID OMER MD; [facility], [address], [place] [address]  Pharmacies that dispensed prescriptions  listed  ----------------------------------------------------------------------------------------------------------------------------------  [id number] [facility]io.; : [facility] [id number], [address] [facility] [address],  [id number] Qio.; : [facility] # [id number], [address] [facility],, [facility] [address],  [id number] [facility] ; [address] [facility] [address], [facility] [address],  Patients that match search criteria  ----------------------------------------------------------------------------------------------------------------------------------  [id number] YIFAN ALVARES,  89; [address] [facility], [facility] [address]  [id number] YIFAN ALVARES,  89; [address] [facility] , [facility] [address]  [id number] YIFAN ALVARES,  89; [id number], [facility] [address]  [id number] YIFAN JAVIER,  89; [address], [place] [address]  [id number] YIFAN ALVARES  89; [address], [place] [address]  [id number] YIFAN JAVIER,  89; GENERAL DELIVERY, [facility] [address]  [id number] YIFAN ALVARES,  89; [address], [place] [address]  [id number] YIFAN ALVARES,  89; [address], [place] [address]  [id number] YIFAN ALVARES,  89; [address], [place] [address]  [id number] YIFAN ALVARES,  89; [address], [place] [address]  MED Summary  This section displays cumulative MED values by unique recipient. The MED Max value is the maximum occurrence of cumulative MED  sustained for any 3 consecutive days. This value is calculated based on prescriptions dispensed during the date range requested.  -----------------------------------------------------------------------------------------------------------------------------------  **Per CDC guidance, the conversion factors and associated daily morphine milligram equivalents for drugs prescribed as part of  medication-assisted treatment for opioid use  disorder should not be used to benchmark against dosage thresholds meant for opioids  prescribed for pain.    Correct pharmacy verified with patient and confirmed in snapshot? [x] yes []no    Charge captured ? [x] yes  [] no    Medications Phoned  to Pharmacy [] yes [x]no  Name of Pharmacist:  List Medications, including dose, quantity and instructions      Medication Prescriptions given to patient   [] yes  [x] no   List the name of the drug the prescription was written for.       Medications ordered this visit were e-scribed.  Verified by order class [x] yes  [] no  Suboxone and Gabapentin  Medication changes or discontinuations were communicated to patient's pharmacy: [] yes  [x] no    UA collected [x] yes  [] no    [place] Prescription Monitoring Program Reviewed? [] yes  [x] no    Referrals were made to:  none    Future appointment was made: [x] yes  [] no  11/10/17  Dictation completed at time of chart check: [] yes  [x] no    I have checked the documentation for today s encounters and the above information has been reviewed and completed.

## 2021-06-13 NOTE — PROGRESS NOTES
Correct pharmacy verified with patient and confirmed in snapshot? [x] yes []no    Charge captured ? [x] yes  [] no    Medications Phoned  to Pharmacy [] yes [x]no  Name of Pharmacist:  List Medications, including dose, quantity and instructions      Medication Prescriptions given to patient   [] yes  [x] no   List the name of the drug the prescription was written for.       Medications ordered this visit were e-scribed.  Verified by order class [] yes  [x] no    Medication changes or discontinuations were communicated to patient's pharmacy: [] yes  [x] no    UA collected [x] yes  [] no    [place] Prescription Monitoring [place] Reviewed? [x] yes  [] no    Referrals were made to: none     Future appointment was made: [x] yes  [] no    Dictation completed at time of chart check: [x] yes  [] no    I have checked the documentation for today s encounters and the above information has been reviewed and completed.

## 2021-06-13 NOTE — PROGRESS NOTES
Patient here today for follow up of medication management. Last DAM 10/11/17-negative, BUP 10/11/17-positive. States he has maintained sobriety.     [facility] [place] Date: 10/19/17  Query Report Page#: 1  Patient Rx History Report  KANDACE ALVARES  Search Criteria: Last Name 'kandace' and First Name tom' and  =  and Request Period =  to  '10/19/17' - 10 out of 10 Recipients Selected.  Fill Date Product, Str, Form Qty Days Pt ID Prescriber Written RX# N/R* Pharm **MED+  ---------- -------------------------------- ------ ---- --------- ---------- ---------- ------------ ----- --------- ------  10/14/2017 SUBOXONE 8 MG-2 MG SL FILM 28.00 14 [id number] 10/11/2017 [id number] N [id number] 480.0  10/11/2017 GABAPENTIN 400 MG CAPSULE 110.00 27 [id number] 10/11/2017 [id number] N [id number] 00.0  10/04/2017 SUBOXONE 8 MG-2 MG SL FILM 16.00 8 [id number] 2017 [id number] N [id number] 480.0  2017 SUBOXONE 8 MG-2 MG SL FILM 16.00 8 [id number] 2017 [id number] N [id number] 480.0  2017 SUBOXONE 8 MG-2 MG SL FILM 28.00 14 [id number] 2017 [id number] N [id number] 480.0  2017 SUBOXONE 8 MG-2 MG SL FILM 60.00 30 [id number] 2017 [id number] N [id number] 480.0  2017 GABAPENTIN 400 MG CAPSULE 110.00 25 [id number] 2017 [id number] N [id number] 00.0  *N/R N=New R=Refill  +MED Daily  Prescribers for prescriptions listed  ----------------------------------------------------------------------------------------------------------------------------------  [id number] MATHEW MONTIEL MD; [address], [place] [address]  [id number] DAVID OMER MD; [facility], [address], [place] [address]  Pharmacies that dispensed prescriptions listed  ----------------------------------------------------------------------------------------------------------------------------------  [id number] [facility]; :  [facility] # [id number], [address] [facility],, [facility] [place] [address],  [id number] [facility].; : [facility] # [id number], [address],, [facility] [place] [address],  [id number] [facility] ; [address] [facility] [place] [address],  Patients that match search criteria  ----------------------------------------------------------------------------------------------------------------------------------  [id number] YIFAN ALVARES,  89; [address] [facility], [place] [address]  [id number] YIFAN ALVARES,  89; [address] [facility] [address] , [place] [address]  [id number] YIFAN ALVARES,  [birth year]; [id number], [place] [address]  [id number] YIFAN JAVIER,  89; [address], [place] [address]  [id number] YIFAN ALVARES,  [birth year]; [address], [place] [address]  [id number] YIFAN JAVIER,  [birth year]; GENERAL DELIVERY, [facility] [address]  [id number] YIFAN ALVARES,  89; [address], [place] [address]  [id number] YIFAN ALVARES,  89; [address], [place] [address]  [id number] YIFAN ALVARES,  89; [address], [place] [address]  [id number] YIFAN ALVARES,  89; [address], [place] [address]

## 2021-06-14 NOTE — PROGRESS NOTES
"Addiction  Nurse Only Visit Note    11/16/2017  Date of last visit: 11/10/17    Reason for today's visit:   Chief Complaint   Patient presents with     Follow-up       Vitals:    11/16/17 1123   BP: 153/80   Patient Site: Right Arm   Patient Position: Sitting   Cuff Size: Adult Large   Pulse: 100   Temp: 98.4  F (36.9  C)   TempSrc: Oral   Weight: (!) 268 lb (121.6 kg)   Height: 5' 7\" (1.702 m)        If having pain, who will address pain:  Has dentist appt next week to get tooth pulled    Is pt assisted: No    Urine for toxicology sent today: yes    Last UA date: 11/10/17  Reviewed with patient: yes  Results: DAM neg    AIMS:     Pill count: NA  (Controlled substance only)  Medications needing renewal: see note    Substance use history:    Using alcohol:No  Using street drugs or unprescribed medications: No  Using opioids other that Suboxone:No  Using tobacco:Yes: Describe: 5 cigs daily    Recovery environment:  Attending formal chemical dependency programming: No  Attending \"outside\" mutual help meetings: Yes: Describe: 4 meetings so far, planning on two more  Has a chemical dependency sponsor: Yes: Describe: temporary one  Has other elements of structure: Yes: Describe: does DBT weekly at her building, kids go to childcare weekly, meets with [employer],   Current Living Situation: own apartment    Cravings: no, pretty good, able to distract herself    Sleep: yes still kind of rough, staying asleep especially    Depression: yes 3-4/10    Anxiety: yes 5-6/10    Panic Attacks: yes two since appt    Paranoia: no    Hallucinations: no    Suicidal Ideation: no    Homicidal Ideation:no  Comments: none    Physical Problems: yes toothache, has dentist appt 11/22/17    Note: Transportation did not show up for pt today, she was able to get ride from mother. Is hoping to have tooth extracted next week. Reports no issues/concerns with Suboxone, thinks recent increase was helpful. Reports she is doing 4-6 " meetings weekly, attending DBT at her building, sees  and [employer] contacts. Verified suboxone count with pharmacy, she picked up Suboxone  (ordered ,14 day supply), and will have 11/10 (14 day supply) available after . Instructed pt to count her films when she comes to next appt. No suboxone ordered today.      Patient Instructions   Continue Medications as ordered.  No alcohol or unprescribed drug use.  No driving, if sedated.  Come to the Emergency Room if not feeling safe.  Call the clinic with any questions [phone].  Follow up as directed, for your appointments, per your After Visit Summary Form.      Call the clinic if any concerns: [facility] [phone]  [facility] [phone]  and Report to the emergency room if you feel like harming yourself or others or are psychotic        Lois Cervantes    2017 11:29 AM    MARÍA ALVARES  Search Criteria: Last Name 'maría' and First Name 'anatoly' and  = ' and Request Period = ' to  '11/15/17' - 10 out of 10 Recipients Selected.  Fill Date Product, Str, Form Qty Days Pt ID Prescriber Written RX# N/R* Pharm **MED+  ---------- -------------------------------- ------ ---- --------- ---------- ---------- ------------ ----- --------- ------  2017 SUBOXONE 8 MG-2 MG SL FILM 35.00 14 [id number] 2017 [id number] N [id number] 600.0  2017 GABAPENTIN 400 MG CAPSULE 110.00 28 [id number] 2017 [id number] N [id number] 00.0  10/27/2017 SUBOXONE 8 MG-2 MG SL FILM 17.00 12 [id number] 10/27/2017 [id number] N [id number] 340.0  10/14/2017 SUBOXONE 8 MG-2 MG SL FILM 28.00 14 [id number] 10/11/2017 [id number] N [id number] 480.0  10/11/2017 GABAPENTIN 400 MG CAPSULE 110.00 27 [id number] 10/11/2017 [id number] N [id number] 00.0  10/04/2017 SUBOXONE 8 MG-2 MG SL FILM 16.00 8 [id number] 2017 [id number] N [id number] 480.0  2017 SUBOXONE 8 MG-2 MG SL FILM 16.00 8 [id number] 2017 [id number] N [id number] 480.0  2017 SUBOXONE 8 MG-2 MG SL FILM 28.00 14 [id number] 2017 [id number] N [id number] 480.0  *N/R N=New R=Refill  +MED Daily  Prescribers for prescriptions listed  ----------------------------------------------------------------------------------------------------------------------------------  [id number] MATHEW MONTIEL MD; [address] [place] [address] [address], [place] [address]  [id number] DAVID OMER MD; [facility], [address], [place] [address]  Pharmacies that dispensed prescriptions listed  ----------------------------------------------------------------------------------------------------------------------------------  [id number] [employer] : [facility] # [id number], [address] [facility] [address],  [id number] [employer].; : [facility] # [id number], [address],, [facility] [place] [address],  [id number] [facility] ; [address] [facility] [address], [facility] [address],  Patients that match search criteria  ----------------------------------------------------------------------------------------------------------------------------------  [id number] YIFAN ALVARES,  89; [address] [facility] [address]  [id number] YIFAN VELAZQUEZA,  89; [address] [facility] [address] [address]  [id number] YIFAN VELAZQUEZA,  89; [id number], [facility] [address]  [id number] YIFAN ALVARES M,  89; [address], [place] [address]  [id number] YIFAN VELAZQUEZA,  89; [address], [place] [address]  [id number] YIFAN JAVIER,  89; GENERAL DELIVERY, [facility] [address]  [id number] YIFAN VELAZQUEZA,  89; [address], [place] [address]  [id number] YIFAN VELAZQUEZA,  89; [address], [place] [address]  [id number] YIFAN VELAZQUEZA,  89; [address], [place] [address]  [id number] YIFAN VELAZQUEZA,  89; [address], [place] [address]  MED  Summary  This section displays cumulative MED values by unique recipient. The MED Max value is the maximum occurrence of cumulative MED  **Per CDC guidance, the conversion factors and associated daily morphine milligram equivalents for drugs prescribed as part of  medication-assisted treatment for opioid use disorder should not be used to benchmark against dosage thresholds meant for opioids  prescribed for pain.

## 2021-06-14 NOTE — PROGRESS NOTES
Patient here today for follow up of medication management. Last DAM-17 positive- Opiates, BUP 17 positive. States she has maintained sobriety.       [employer] [place] Date: 17  Query Report Page#: 1  Patient Rx History Report  KANDACE ALVARES  Search Criteria: Last Name 'kandace' and First Name 'anatoly' and  =  and Request Period =  to  ' - 11 out of 11 Recipients Selected.  Fill Date Product, Str, Form Qty Days Pt ID Prescriber Written RX# N/R* Pharm **MED+  ---------- -------------------------------- ------ ---- --------- ---------- ---------- ------------ ----- --------- ------  2017 SUBOXONE 8 MG-2 MG SL FILM 18.00 8 [id number] 2017 [id number] N [id number] 540.0  12/10/2017 SUBOXONE 8 MG-2 MG SL FILM 5.00 2 [id number] 2017 [id number] N [id number] 600.0  2017 GABAPENTIN 400 MG CAPSULE 110.00 30 [id number] 2017 [id number] N [id number] 00.0  2017 SUBOXONE 8 MG-2 MG SL FILM 36.00 15 [id number] 2017 [id number] N [id number] 576.0  2017 SUBOXONE 8 MG-2 MG SL FILM 35.00 14 [id number] 2017 [id number] N [id number] 600.0  2017 GABAPENTIN 400 MG CAPSULE 110.00 28 [id number] 2017 [id number] N [id number] 00.0  10/27/2017 SUBOXONE 8 MG-2 MG SL FILM 17.00 12 [id number] 10/27/2017 [id number] N [id number] 340.0  10/14/2017 SUBOXONE 8 MG-2 MG SL FILM 28.00 14 [id number] 10/11/2017 [id number] N [id number] 480.0  10/11/2017 GABAPENTIN 400 MG CAPSULE 110.00 27 [id number] 10/11/2017 [id number] N [id number] 00.0  10/04/2017 SUBOXONE 8 MG-2 MG SL FILM 16.00 8 [id number] 2017 [id number] N [id number] 480.0  2017 SUBOXONE 8 MG-2 MG SL FILM 16.00 8 [id number] 2017 [id number] N [id number] 480.0  *N/R N=New R=Refill  +MED Daily  Prescribers for prescriptions  listed  ----------------------------------------------------------------------------------------------------------------------------------  [id number] DAVID OMER MD; [facility], [address], [place] [address]  [id number] BRUNNER, EMILY A MD; [facility], [address] [place] [address] [address],, [place] [address]  Pharmacies that dispensed prescriptions listed  ----------------------------------------------------------------------------------------------------------------------------------  [id number] [employer] : [facility] # [id number], [address],, [place] [address],  [id number] [employer].; : [facility] # [id number], [address],, [facility] [place] [address],  [id number] [facility] ; [address] [place] [address] [address], [facility] [address],  Patients that match search criteria  ----------------------------------------------------------------------------------------------------------------------------------  [id number] YIFAN ALVARES,  89; [address] [facility], [facility] [address]  [id number] YIFAN VELAZQUEZA,  89; [address] [facility] [address] , [facility] [address]  [id number] YIFAN ALVARES,  89; [id number], [facility] [address]  [id number] YIFAN JAVIER,  89; [address], [place] [address]  [id number] YIFAN ALVARES,  89; [address], [place] [address]  [id number] YIFAN JAVIER,  89; GENERAL DELIVERY, [facility] [address]  [id number] YIFAN ALVARES,  89; [address], [place] [address]  [id number] YIFAN ALVARES,  89; [address], [place] [address]  [id number] YIFAN ALVARES,  89; [address], [place] [address]  [id number] YIFAN ALVARES,  89; [address], [place] [address]      Correct pharmacy verified with patient and confirmed in snapshot? [x] yes []no    Charge captured ? [x] yes  [] no    Medications Phoned  to Pharmacy [x] yes []no  Name of  Pharmacist:[place]  List Medications, including dose, quantity and instructions  Suboxone 8-2 mg film, 8 mg twice daily and 4 mg at noon total 20 mg, #75, refill 0. Read back for accuracy     Medication Prescriptions given to patient   [] yes  [x] no   List the name of the drug the prescription was written for.       Medications ordered this visit were e-scribed.  Verified by order class [] yes  [x] no    Medication changes or discontinuations were communicated to patient's pharmacy: [] yes  [x] no    UA collected [x] yes  [] no    [place] Prescription Monitoring Program Reviewed? [x] yes  [] no    Referrals were made to: none     Future appointment was made: [] yes  [x] no    Dictation completed at time of chart check: [] yes  [x] no    I have checked the documentation for today s encounters and the above information has been reviewed and completed.

## 2021-06-14 NOTE — PROGRESS NOTES
Patient was seen today in follow-up for opiate dependence.    Last UDS performed on 17 and was: negative  Last BUP performed on 10/11/17 and was positive    She is doing well and is here for routine follow up. Craving for heroin are minimal. Uses Clonidine twice a day.    See attached  report below      Language123 Date: 11/10/17  Query Report Page#: 1  Patient Rx History Report  YIFAN ALVARES  Search Criteria: Last Name 'Yifan' and First Name 'Linnette' and  = ' and Request Period =  to  '11/10/17' - 10 out of 10 Recipients Selected.  Fill Date Product, Str, Form Qty Days Pt ID Prescriber Written RX# N/R* Pharm **MED+  ---------- -------------------------------- ------ ---- --------- ---------- ---------- ------------ ----- --------- ------  2017 SUBOXONE 8 MG-2 MG SL FILM 35.00 14 [id number] 2017 [id number] N [id number] 600.0  2017 GABAPENTIN 400 MG CAPSULE 110.00 28 [id number] 2017 [id number] N [id number] 00.0  10/27/2017 SUBOXONE 8 MG-2 MG SL FILM 17.00 12 [id number] 10/27/2017 [id number] N [id number] 340.0  10/14/2017 SUBOXONE 8 MG-2 MG SL FILM 28.00 14 [id number] 10/11/2017 [id number] N [id number] 480.0  10/11/2017 GABAPENTIN 400 MG CAPSULE 110.00 27 [id number] 10/11/2017 [id number] N [id number] 00.0  10/04/2017 SUBOXONE 8 MG-2 MG SL FILM 16.00 8 [id number] 2017 [id number] N [id number] 480.0  2017 SUBOXONE 8 MG-2 MG SL FILM 16.00 8 [id number] 2017 [id number] N [id number] 480.0  2017 SUBOXONE 8 MG-2 MG SL FILM 28.00 14 [id number] 2017 [id number] N [id number] 480.0  *N/R N=New R=Refill  +MED Daily  Prescribers for prescriptions listed  ----------------------------------------------------------------------------------------------------------------------------------  [id number] MATHEW MONTIEL MD; [address], [place] [address]  [id number] DAVID OMER MD;  [facility], [address], [place] [address]  Pharmacies that dispensed prescriptions listed  ----------------------------------------------------------------------------------------------------------------------------------  [id number] [employer].; : [facility] # [id number], [address] [facility] [address],  [id number] [employer].; : [facility] # [id number], [address], [facility] [address],  [id number] [facility] ; [address] [facility] [address], [facility] [address],  Patients that match search criteria  ----------------------------------------------------------------------------------------------------------------------------------  [id number] YIFAN ALVARES  88; [address] [facility], [facility] [address]  [id number] YIFAN ALVARES  89; [address] [facility] , [facility] [address]  [id number] YIFAN ALVARES  89; [id number], [facility] [address]  [id number] YIFAN JAVIER  89; [address], [place] [address]  [id number] YIFAN ALVARES,  89; [address], [place] [address]  [id number] YIFAN ALVARES M,  88; GENERAL DELIVERY, [place] [address]  [id number] YIFAN LAVARES,  89; [address], [place] [address]  [id number] YIFAN ALVARES,  89; [address], [place] [address]  [id number] YIFAN ALVARES,  89; [address], [place] [address]  [id number] YIFAN ALVARES,  89; [address], [place] [address]  MED Summary  This section displays cumulative MED values by unique recipient. The MED Max value is the maximum occurrence of cumulative MED  **Per CDC guidance, the conversion factors and associated daily morphine milligram equivalents for drugs prescribed as part of  medication-assisted treatment for opioid use disorder should not be used to benchmark against dosage thresholds meant for opioids  prescribed for pain.      Correct pharmacy verified with patient and  confirmed in snapshot? [x] yes []no    Charge captured ? [x] yes  [] no    Medications Phoned  to Pharmacy [] yes [x]no  Name of Pharmacist:  List Medications, including dose, quantity and instructions      Medication Prescriptions given to patient   [] yes  [x] no   List the name of the drug the prescription was written for.       Medications ordered this visit were e-scribed.  Verified by order class [x] yes  [] no   Suboxone, Propranolol, and Trazodone  Medication changes or discontinuations were communicated to patient's pharmacy: [x] yes  [] no  Called [facility] and discontinued Clonidine  UA collected [x] yes  [] no    [place] Prescription Monitoring Program Reviewed? [x] yes  [] no    Referrals were made to:  none    Future appointment was made: [x] yes  [] no  11/16/17  Dictation completed at time of chart check: [x] yes  [] no    I have checked the documentation for today s encounters and the above information has been reviewed and completed.

## 2021-06-14 NOTE — PROGRESS NOTES
"Addiction  Nurse Only Visit Note    11/29/2017  Date of last visit: 11/16/2017    Reason for today's visit: follow-up  Chief Complaint   Patient presents with     Follow-up       Vitals:    11/29/17 1126   BP: 139/77   Patient Site: Right Arm   Patient Position: Sitting   Cuff Size: Adult Large   Pulse: 87   Resp: 20   Temp: 99.2  F (37.3  C)   TempSrc: Oral   Weight: (!) 272 lb (123.4 kg)   Height: 5' 7\" (1.702 m)        If having pain, who will address pain:  0    Is pt assisted: No    Urine for toxicology sent today: yes    Last UA date: 11/16/2017  Reviewed with patient: yes  Results: + for BUP    AIMS: NA    Pill count: Yes, 32.5  (Controlled substance only)  Medications needing renewal: none needed    Substance use history:    Using alcohol:No  Using street drugs or unprescribed medications: No  Using opioids other that Suboxone:No  Using tobacco:Yes: Describe: 0.25 ppd    Recovery environment:  Attending formal chemical dependency programming: No  Attending \"outside\" mutual help meetings: Yes: Describe: NA meetings 4 last week  Has a chemical dependency sponsor: Yes: Describe: temporary and talks to 1-3 times weekly  Has other elements of structure: Yes: Describe: Kait  Current Living Situation: family    Cravings: no    Sleep: no    Depression: yes minimal    Anxiety: yes moderate    Panic Attacks: no    Paranoia: no      Hallucinations: no      Suicidal Ideation: no    Homicidal Ideation:no  Comments: NA    Physical Problems: no, tooth pulled and done    Feels the Clonidine worked better with the anxiety and sleep, than the Propranolol does, unsure if higher dose would help.  No refills needed today, will bring in her Buprenorphine-Naloxone for counting at next visit.  She requested copy of her DAM's for the past 6 weeks, and she signed an MALIA for these.        Education Done Today  Patient Instructions: Continue Medications as ordered.  No alcohol or unprescribed drug use.  No driving, if sedated.  Come " to the Emergency Room if not feeling safe.  Call the clinic with any questions [phone].  If you identify that you are pregnant, please call us to inform us, as medications may need to be changed.  You can also contact Urgent Care Crisis Line at [phone] 24 hours a day for help.  Follow up as directed, for your appointments, per your After Visit Summary Form.              Rizwana Morejon    2017 11:30 AM    [employer] [place] Date: 17  Query Report Page#: 1  Patient Rx History Report  KANDACE ALVARES  Search Criteria: Last Name 'kandace' and First Name 'anatoly' and  =  and Request Period =  to  ' - 10 out of 10 Recipients Selected.  Fill Date Product, Str, Form Qty Days Pt ID Prescriber Written RX# N/R* Pharm **MED+  ---------- -------------------------------- ------ ---- --------- ---------- ---------- ------------ ----- --------- ------  2017 SUBOXONE 8 MG-2 MG SL FILM 35.00 14 [id number] 2017 [id number] N [id number] 600.0  2017 GABAPENTIN 400 MG CAPSULE 110.00 28 [id number] 2017 [id number] N [id number] 00.0  10/27/2017 SUBOXONE 8 MG-2 MG SL FILM 17.00 12 [id number] 10/27/2017 [id number] N [id number] 340.0  10/14/2017 SUBOXONE 8 MG-2 MG SL FILM 28.00 14 [id number] 10/11/2017 [id number] N [id number] 480.0  10/11/2017 GABAPENTIN 400 MG CAPSULE 110.00 27 [id number] 10/11/2017 [id number] N [id number] 00.0  10/04/2017 SUBOXONE 8 MG-2 MG SL FILM 16.00 8 [id number] 2017 [id number] N [id number] 480.0  2017 SUBOXONE 8 MG-2 MG SL FILM 16.00 8 [id number] 2017 [id number] N [id number] 480.0  2017 SUBOXONE 8 MG-2 MG SL FILM 28.00 14 [id number] 2017 [id number] N [id number] 480.0  *N/R N=New R=Refill  +MED Daily  Prescribers for prescriptions  listed  ----------------------------------------------------------------------------------------------------------------------------------  [id number] MATHEW MONTIEL MD; [address] [place], [place] [id number], [place] [address]  [id number] DAVID OMER MD; [facility], [address], [place] [address]  Pharmacies that dispensed prescriptions listed  ----------------------------------------------------------------------------------------------------------------------------------  [id number] [employer] : [facility] # [id number], [address],, [place] [address],  [id number] [employer].; : [facility] # [id number], [address] [facility],, [facility] [place] [address],  [id number] [facility] ; [address] [place] [address] [address], [facility] [place] [address],  Patients that match search criteria  ----------------------------------------------------------------------------------------------------------------------------------  [id number] YIFAN VELAZQUEZA,  89; [address] [facility], [facility] [address]  [id number] YIFAN VELAZQUEZA,  89; [address] , [facility] [address]  [id number] YIFAN VELAZQUEZA,  89; [id number], [facility] [address]  [id number] YIFAN ALVARES M,  89; [address], [place] [address]  [id number] YIFAN VELAZQUEZA,  89; [address], [place] [address]  [id number] YIFAN ALVARES M,  89; GENERAL DELIVERY, [facility] [address]  [id number] YIFAN VELAZQUEZA,  89; [address], [place] [address]  [id number] YIFAN VELAZQUEZA,  89; [address], [place] [address]  [id number] YIFAN VELAZQUEZA,  89; [address], [place] [address]  [id number] YIFAN VELAZQUEZA,  89; [address], [place] [address]  MED Summary  This section displays cumulative MED values by unique recipient. The MED Max value is the maximum occurrence of cumulative MED  **Per CDC guidance, the conversion factors and associated daily morphine  milligram equivalents for drugs prescribed as part of  medication-assisted treatment for opioid use disorder should not be used to benchmark against dosage thresholds meant for opioids  prescribed for pain.  Report Disclaimers:  The report provided above is based upon the search criteria and the data provided by the dispensing entities. For more information  about any prescription, please contact the dispenser or the prescriber.  This report contains confidential information, including patient identifiers, and is not a public record. The information on this  report must be treated as protected health information and is to be disclosed to others only as authorized by applicable state  and Federal regulations.

## 2021-07-03 NOTE — ADDENDUM NOTE
Addendum Note by Myhre, David J, RN at 9/1/2017  9:00 AM     Author: Myhre, David J, RN Service: -- Author Type: Registered Nurse    Filed: 9/1/2017  9:00 AM Encounter Date: 8/31/2017 Status: Signed    : Myhre, David J, RN (Registered Nurse)    Addended by: MYHRE, DAVID J on: 9/1/2017 09:00 AM        Modules accepted: Orders